# Patient Record
Sex: MALE | Race: WHITE | NOT HISPANIC OR LATINO | Employment: OTHER | URBAN - METROPOLITAN AREA
[De-identification: names, ages, dates, MRNs, and addresses within clinical notes are randomized per-mention and may not be internally consistent; named-entity substitution may affect disease eponyms.]

---

## 2023-07-12 LAB
ANION GAP IN SER/PLAS: 14 MMOL/L (ref 10–20)
CALCIUM (MG/DL) IN SER/PLAS: 9.1 MG/DL (ref 8.6–10.3)
CARBON DIOXIDE, TOTAL (MMOL/L) IN SER/PLAS: 28 MMOL/L (ref 21–32)
CHLORIDE (MMOL/L) IN SER/PLAS: 100 MMOL/L (ref 98–107)
CREATININE (MG/DL) IN SER/PLAS: 1.11 MG/DL (ref 0.5–1.3)
ERYTHROCYTE DISTRIBUTION WIDTH (RATIO) BY AUTOMATED COUNT: 14.6 % (ref 11.5–14.5)
ERYTHROCYTE MEAN CORPUSCULAR HEMOGLOBIN CONCENTRATION (G/DL) BY AUTOMATED: 34.1 G/DL (ref 32–36)
ERYTHROCYTE MEAN CORPUSCULAR VOLUME (FL) BY AUTOMATED COUNT: 85 FL (ref 80–100)
ERYTHROCYTES (10*6/UL) IN BLOOD BY AUTOMATED COUNT: 4.53 X10E12/L (ref 4.5–5.9)
GFR MALE: 73 ML/MIN/1.73M2
GLUCOSE (MG/DL) IN SER/PLAS: 116 MG/DL (ref 74–99)
HEMATOCRIT (%) IN BLOOD BY AUTOMATED COUNT: 38.4 % (ref 41–52)
HEMOGLOBIN (G/DL) IN BLOOD: 13.1 G/DL (ref 13.5–17.5)
INR IN PPP BY COAGULATION ASSAY: 1.1 (ref 0.9–1.1)
LEUKOCYTES (10*3/UL) IN BLOOD BY AUTOMATED COUNT: 8 X10E9/L (ref 4.4–11.3)
PLATELETS (10*3/UL) IN BLOOD AUTOMATED COUNT: 142 X10E9/L (ref 150–450)
POTASSIUM (MMOL/L) IN SER/PLAS: 3.2 MMOL/L (ref 3.5–5.3)
PROTHROMBIN TIME (PT) IN PPP BY COAGULATION ASSAY: 12.7 SEC (ref 9.8–12.8)
SODIUM (MMOL/L) IN SER/PLAS: 139 MMOL/L (ref 136–145)
UREA NITROGEN (MG/DL) IN SER/PLAS: 28 MG/DL (ref 6–23)

## 2023-08-24 LAB
ANION GAP IN SER/PLAS: 15 MMOL/L (ref 10–20)
CALCIUM (MG/DL) IN SER/PLAS: 9.4 MG/DL (ref 8.6–10.3)
CARBON DIOXIDE, TOTAL (MMOL/L) IN SER/PLAS: 30 MMOL/L (ref 21–32)
CHLORIDE (MMOL/L) IN SER/PLAS: 101 MMOL/L (ref 98–107)
CREATININE (MG/DL) IN SER/PLAS: 1.25 MG/DL (ref 0.5–1.3)
ERYTHROCYTE DISTRIBUTION WIDTH (RATIO) BY AUTOMATED COUNT: 14.8 % (ref 11.5–14.5)
ERYTHROCYTE MEAN CORPUSCULAR HEMOGLOBIN CONCENTRATION (G/DL) BY AUTOMATED: 33 G/DL (ref 32–36)
ERYTHROCYTE MEAN CORPUSCULAR VOLUME (FL) BY AUTOMATED COUNT: 89 FL (ref 80–100)
ERYTHROCYTES (10*6/UL) IN BLOOD BY AUTOMATED COUNT: 4.67 X10E12/L (ref 4.5–5.9)
GFR MALE: 63 ML/MIN/1.73M2
GLUCOSE (MG/DL) IN SER/PLAS: 118 MG/DL (ref 74–99)
HEMATOCRIT (%) IN BLOOD BY AUTOMATED COUNT: 41.5 % (ref 41–52)
HEMOGLOBIN (G/DL) IN BLOOD: 13.7 G/DL (ref 13.5–17.5)
LEUKOCYTES (10*3/UL) IN BLOOD BY AUTOMATED COUNT: 8.1 X10E9/L (ref 4.4–11.3)
PLATELETS (10*3/UL) IN BLOOD AUTOMATED COUNT: 146 X10E9/L (ref 150–450)
POTASSIUM (MMOL/L) IN SER/PLAS: 3.5 MMOL/L (ref 3.5–5.3)
SODIUM (MMOL/L) IN SER/PLAS: 142 MMOL/L (ref 136–145)
UREA NITROGEN (MG/DL) IN SER/PLAS: 25 MG/DL (ref 6–23)

## 2023-09-01 ENCOUNTER — HOSPITAL ENCOUNTER (OUTPATIENT)
Dept: DATA CONVERSION | Facility: HOSPITAL | Age: 68
End: 2023-09-01
Attending: INTERNAL MEDICINE | Admitting: INTERNAL MEDICINE
Payer: MEDICARE

## 2023-09-01 DIAGNOSIS — I10 ESSENTIAL (PRIMARY) HYPERTENSION: ICD-10-CM

## 2023-09-01 DIAGNOSIS — I25.810 ATHEROSCLEROSIS OF CORONARY ARTERY BYPASS GRAFT(S) WITHOUT ANGINA PECTORIS: ICD-10-CM

## 2023-09-01 DIAGNOSIS — R07.89 OTHER CHEST PAIN: ICD-10-CM

## 2023-09-01 DIAGNOSIS — I25.10 ATHEROSCLEROTIC HEART DISEASE OF NATIVE CORONARY ARTERY WITHOUT ANGINA PECTORIS: ICD-10-CM

## 2023-09-01 DIAGNOSIS — I25.82 CHRONIC TOTAL OCCLUSION OF CORONARY ARTERY: ICD-10-CM

## 2023-09-01 DIAGNOSIS — E78.5 HYPERLIPIDEMIA, UNSPECIFIED: ICD-10-CM

## 2023-09-01 DIAGNOSIS — Z95.5 PRESENCE OF CORONARY ANGIOPLASTY IMPLANT AND GRAFT: ICD-10-CM

## 2023-09-01 DIAGNOSIS — Z95.1 PRESENCE OF AORTOCORONARY BYPASS GRAFT: ICD-10-CM

## 2023-09-01 DIAGNOSIS — I25.2 OLD MYOCARDIAL INFARCTION: ICD-10-CM

## 2023-09-11 ENCOUNTER — HOSPITAL ENCOUNTER (OUTPATIENT)
Dept: DATA CONVERSION | Facility: HOSPITAL | Age: 68
Discharge: HOME | End: 2023-09-11
Payer: MEDICARE

## 2023-09-11 DIAGNOSIS — Z01.89 ENCOUNTER FOR OTHER SPECIFIED SPECIAL EXAMINATIONS: ICD-10-CM

## 2023-09-11 DIAGNOSIS — Z12.5 ENCOUNTER FOR SCREENING FOR MALIGNANT NEOPLASM OF PROSTATE: ICD-10-CM

## 2023-09-11 DIAGNOSIS — E55.9 VITAMIN D DEFICIENCY, UNSPECIFIED: ICD-10-CM

## 2023-09-11 DIAGNOSIS — E11.65 TYPE 2 DIABETES MELLITUS WITH HYPERGLYCEMIA (MULTI): ICD-10-CM

## 2023-09-11 DIAGNOSIS — E78.2 MIXED HYPERLIPIDEMIA: ICD-10-CM

## 2023-09-11 LAB
25(OH)D3 SERPL-MCNC: 33 NG/ML (ref 31–100)
ALBUMIN SERPL-MCNC: 3.9 GM/DL (ref 3.5–5)
ALBUMIN/GLOB SERPL: 1.3 RATIO (ref 1.5–3)
ALP BLD-CCNC: 102 U/L (ref 35–125)
ALT SERPL-CCNC: 26 U/L (ref 5–40)
ANION GAP SERPL CALCULATED.3IONS-SCNC: 12 MMOL/L (ref 0–19)
APPEARANCE PLAS: ABNORMAL
AST SERPL-CCNC: 23 U/L (ref 5–40)
BASOPHILS # BLD AUTO: 0.05 K/UL (ref 0–0.22)
BASOPHILS NFR BLD AUTO: 0.8 % (ref 0–1)
BILIRUB DIRECT SERPL-MCNC: 0.2 MG/DL (ref 0–0.2)
BILIRUB INDIRECT SERPL-MCNC: 0.6 MG/DL (ref 0–0.8)
BILIRUB SERPL-MCNC: 0.8 MG/DL (ref 0.1–1.2)
BUN SERPL-MCNC: 20 MG/DL (ref 8–25)
BUN/CREAT SERPL: 20 RATIO (ref 8–21)
CALCIUM SERPL-MCNC: 8.9 MG/DL (ref 8.5–10.4)
CHLORIDE SERPL-SCNC: 107 MMOL/L (ref 97–107)
CHOLEST SERPL-MCNC: 93 MG/DL (ref 133–200)
CHOLEST/HDLC SERPL: 2.9 RATIO
CO2 SERPL-SCNC: 26 MMOL/L (ref 24–31)
COLOR SPUN FLD: ABNORMAL
CREAT SERPL-MCNC: 1 MG/DL (ref 0.4–1.6)
CREAT UR-MCNC: 113.4 MG/DL
DEPRECATED RDW RBC AUTO: 46.4 FL (ref 37–54)
DIFFERENTIAL METHOD BLD: ABNORMAL
EOSINOPHIL # BLD AUTO: 0.25 K/UL (ref 0–0.45)
EOSINOPHIL NFR BLD: 3.8 % (ref 0–3)
ERYTHROCYTE [DISTWIDTH] IN BLOOD BY AUTOMATED COUNT: 14.4 % (ref 11.7–15)
FASTING STATUS PATIENT QL REPORTED: ABNORMAL
GFR SERPL CREATININE-BSD FRML MDRD: 82 ML/MIN/1.73 M2
GLOBULIN SER-MCNC: 3 G/DL (ref 1.9–3.7)
GLUCOSE SERPL-MCNC: 112 MG/DL (ref 65–99)
HBA1C MFR BLD: 6.3 % (ref 4–6)
HCT VFR BLD AUTO: 38.3 % (ref 41–50)
HDLC SERPL-MCNC: 32 MG/DL
HGB BLD-MCNC: 12.6 GM/DL (ref 13.5–16.5)
IMM GRANULOCYTES # BLD AUTO: 0.01 K/UL (ref 0–0.1)
LDLC SERPL CALC-MCNC: 42 MG/DL (ref 65–130)
LYMPHOCYTES # BLD AUTO: 2 K/UL (ref 1.2–3.2)
LYMPHOCYTES NFR BLD MANUAL: 30.7 % (ref 20–40)
MCH RBC QN AUTO: 29.2 PG (ref 26–34)
MCHC RBC AUTO-ENTMCNC: 32.9 % (ref 31–37)
MCV RBC AUTO: 88.7 FL (ref 80–100)
MICROALBUMIN UR-MCNC: 19 MG/L (ref 0–23)
MICROALBUMIN/CREAT UR: 16.8 MG/G (ref 0–30)
MONOCYTES # BLD AUTO: 0.51 K/UL (ref 0–0.8)
MONOCYTES NFR BLD MANUAL: 7.8 % (ref 0–8)
NEUTROPHILS # BLD AUTO: 3.7 K/UL
NEUTROPHILS # BLD AUTO: 3.7 K/UL (ref 1.8–7.7)
NEUTROPHILS.IMMATURE NFR BLD: 0.2 % (ref 0–1)
NEUTS SEG NFR BLD: 56.7 % (ref 50–70)
NRBC BLD-RTO: 0 /100 WBC
PLATELET # BLD AUTO: 133 K/UL (ref 150–450)
PMV BLD AUTO: 12.6 CU (ref 7–12.6)
POTASSIUM SERPL-SCNC: 3.3 MMOL/L (ref 3.4–5.1)
PROT SERPL-MCNC: 6.9 G/DL (ref 5.9–7.9)
PSA SERPL-MCNC: 0.6 NG/ML (ref 0–4.1)
RBC # BLD AUTO: 4.32 M/UL (ref 4.5–5.5)
SODIUM SERPL-SCNC: 144 MMOL/L (ref 133–145)
T4 FREE SERPL-MCNC: 1.4 NG/DL (ref 0.9–1.7)
TRIGL SERPL-MCNC: 94 MG/DL (ref 40–150)
TSH SERPL DL<=0.05 MIU/L-ACNC: 4.55 MIU/L (ref 0.27–4.2)
WBC # BLD AUTO: 6.5 K/UL (ref 4.5–11)

## 2023-09-17 PROBLEM — I46.9 CARDIAC ARREST (MULTI): Status: ACTIVE | Noted: 2023-09-17

## 2023-09-17 PROBLEM — K85.90 PANCREATITIS (HHS-HCC): Status: ACTIVE | Noted: 2023-09-17

## 2023-09-17 PROBLEM — R14.0 ABDOMINAL DISTENSION: Status: ACTIVE | Noted: 2023-09-17

## 2023-09-17 PROBLEM — E11.9 DIABETES MELLITUS WITHOUT COMPLICATION (MULTI): Status: ACTIVE | Noted: 2023-09-17

## 2023-09-17 PROBLEM — K86.3 PSEUDOCYST OF PANCREAS (HHS-HCC): Status: ACTIVE | Noted: 2023-09-17

## 2023-09-17 PROBLEM — E78.5 HYPERLIPIDEMIA: Status: ACTIVE | Noted: 2023-09-17

## 2023-09-17 PROBLEM — Z95.810 CARDIAC DEFIBRILLATOR IN PLACE: Status: ACTIVE | Noted: 2023-09-17

## 2023-09-17 PROBLEM — F32.A DEPRESSION: Status: ACTIVE | Noted: 2023-09-17

## 2023-09-17 PROBLEM — K21.9 GASTROESOPHAGEAL REFLUX DISEASE: Status: ACTIVE | Noted: 2023-09-17

## 2023-09-17 PROBLEM — N40.0 BENIGN PROSTATIC HYPERPLASIA: Status: ACTIVE | Noted: 2023-09-17

## 2023-09-17 PROBLEM — L30.9 ECZEMA: Status: ACTIVE | Noted: 2023-09-17

## 2023-09-17 PROBLEM — I42.9 CARDIOMYOPATHY (MULTI): Status: ACTIVE | Noted: 2023-09-17

## 2023-09-17 PROBLEM — G47.30 SLEEP APNEA: Status: ACTIVE | Noted: 2023-09-17

## 2023-09-17 PROBLEM — I25.10 ARTERIOSCLEROSIS OF CORONARY ARTERY: Status: ACTIVE | Noted: 2023-09-17

## 2023-09-17 PROBLEM — D69.6 THROMBOCYTOPENIA (CMS-HCC): Status: ACTIVE | Noted: 2023-09-17

## 2023-09-17 PROBLEM — I50.9 CONGESTIVE HEART FAILURE (MULTI): Status: ACTIVE | Noted: 2023-09-17

## 2023-09-17 PROBLEM — I10 HYPERTENSION: Status: ACTIVE | Noted: 2023-09-17

## 2023-09-17 PROBLEM — R60.9 EDEMA: Status: ACTIVE | Noted: 2023-09-17

## 2023-09-17 RX ORDER — DOXAZOSIN 8 MG/1
8 TABLET ORAL DAILY
COMMUNITY
End: 2023-12-04 | Stop reason: SDUPTHER

## 2023-09-17 RX ORDER — FAMOTIDINE 20 MG/1
20 TABLET, FILM COATED ORAL DAILY
COMMUNITY
End: 2024-03-20 | Stop reason: SDUPTHER

## 2023-09-17 RX ORDER — ATORVASTATIN CALCIUM 80 MG/1
80 TABLET, FILM COATED ORAL DAILY
COMMUNITY
End: 2023-12-06 | Stop reason: SDUPTHER

## 2023-09-17 RX ORDER — NITROGLYCERIN 0.4 MG/1
TABLET SUBLINGUAL
COMMUNITY
Start: 2011-05-14 | End: 2024-03-20 | Stop reason: SDUPTHER

## 2023-09-17 RX ORDER — ASPIRIN 81 MG/1
TABLET ORAL
COMMUNITY
End: 2024-03-20 | Stop reason: SDUPTHER

## 2023-09-17 RX ORDER — MAGNESIUM HYDROXIDE 400 MG/5ML
SUSPENSION, ORAL (FINAL DOSE FORM) ORAL
COMMUNITY
Start: 2014-11-10 | End: 2024-03-20 | Stop reason: SDUPTHER

## 2023-09-17 RX ORDER — FUROSEMIDE 20 MG/1
20 TABLET ORAL EVERY 12 HOURS
COMMUNITY
End: 2023-10-26 | Stop reason: SDUPTHER

## 2023-09-17 RX ORDER — AMLODIPINE BESYLATE 5 MG/1
5 TABLET ORAL DAILY
COMMUNITY
End: 2024-03-20 | Stop reason: SDUPTHER

## 2023-09-17 RX ORDER — METFORMIN HYDROCHLORIDE 500 MG/1
TABLET, EXTENDED RELEASE ORAL
COMMUNITY
Start: 2021-02-17 | End: 2024-01-08 | Stop reason: SDUPTHER

## 2023-09-17 RX ORDER — LOSARTAN POTASSIUM AND HYDROCHLOROTHIAZIDE 25; 100 MG/1; MG/1
1 TABLET ORAL DAILY
COMMUNITY
End: 2024-03-20 | Stop reason: SDUPTHER

## 2023-09-17 RX ORDER — FLAXSEED OIL 1000 MG
CAPSULE ORAL
COMMUNITY
Start: 2014-10-20

## 2023-09-17 RX ORDER — CARVEDILOL 25 MG/1
TABLET ORAL
COMMUNITY
Start: 2014-10-20 | End: 2023-11-28 | Stop reason: SDUPTHER

## 2023-09-18 ENCOUNTER — HOSPITAL ENCOUNTER (OUTPATIENT)
Dept: DATA CONVERSION | Facility: HOSPITAL | Age: 68
End: 2023-09-18
Attending: INTERNAL MEDICINE | Admitting: INTERNAL MEDICINE
Payer: MEDICARE

## 2023-09-18 DIAGNOSIS — I47.20 VENTRICULAR TACHYCARDIA, UNSPECIFIED (MULTI): ICD-10-CM

## 2023-09-23 LAB
ATRIAL RATE: 44 BPM
P AXIS: 93 DEGREES
P OFFSET: 180 MS
P ONSET: 121 MS
Q ONSET: 189 MS
QRS COUNT: 14 BEATS
QRS DURATION: 184 MS
QT INTERVAL: 506 MS
QTC CALCULATION(BAZETT): 591 MS
QTC FREDERICIA: 561 MS
R AXIS: 211 DEGREES
T AXIS: 59 DEGREES
T OFFSET: 442 MS
VENTRICULAR RATE: 82 BPM

## 2023-09-29 VITALS — HEIGHT: 68 IN | WEIGHT: 251.32 LBS | BODY MASS INDEX: 38.09 KG/M2

## 2023-09-29 LAB
POC ACTIVATED CLOTTING TIME HIGH RANGE: 146 SECONDS (ref 96–152)
POC ACTIVATED CLOTTING TIME HIGH RANGE: 178 SECONDS (ref 96–152)
POC ACTIVATED CLOTTING TIME HIGH RANGE: 251 SECONDS (ref 96–152)

## 2023-09-30 NOTE — H&P
History & Physical Reviewed:   I have reviewed the History and Physical dated:  15-Aug-2023   History and Physical reviewed and relevant findings noted. Patient examined to review pertinent physical  findings.: No significant changes   Home Medications Reviewed: no changes noted   Allergies Reviewed: no changes noted       Airway/Sedation Assessment:  ·  Emotional Status calm   ·  Neurologic alert & oriented x 3   ·  Respiratory clear to auscultation   ·  Cardiovascular rhythm & rate regular   · Pulses present: Pedal Right     ·  Mouth Opening OK yes   ·  Neck Flexibility OK yes   ·  Loose Teeth no   ·  Oropharyngeal Classification Class II   ·  ASA PS Classification ASA II   ·  Sedation Plan moderate sedation       ERAS (Enhanced Recovery After Surgery):  ·  ERAS Patient: no     Consent:   COVID-19 Consent:  ·  COVID-19 Risk Consent Surgeon has reviewed key risks related to the risk of tom COVID-19 and if they contract COVID-19 what the risks are.       Electronic Signatures:  Camron Masters)  (Signed 01-Sep-2023 08:38)   Authored: History & Physical Reviewed, Airway/Sedation,  ERAS, Consent, Note Completion      Last Updated: 01-Sep-2023 08:38 by Camron Masters)

## 2023-09-30 NOTE — H&P
History of Present Illness:   History Present Illness:  Reason for surgery: Ventricular Tachycardia device  therapy   HPI:    Homero Brasher is a 67 year old with HTN, CAD s/p CABG in 2003, Hx of Cardiac Arrest - on 09/25/2014 s/p CRT- D presenting with recurrent ICD therapy. He presents  for a VT RFA.     Results of cMRI and LHC noted in the EMR.       Allergies:        Allergies:  ·  No Known Allergies :     Home Medication Review:   Home Medications Reviewed: yes     Impression/Procedure:   ·  Impression and Planned Procedure: Recurrent VT presenting for RFA.       ERAS (Enhanced Recovery After Surgery):  ·  ERAS Patient: no       Physical Exam by System:    Constitutional: Well developed, awake/alert/oriented  x3, no distress, alert and cooperative   Respiratory/Thorax: Patent airways, normal breath  sounds with good chest expansion, thorax symmetric   Cardiovascular: Regular, 2+ equal pulses of the extremities,   Gastrointestinal: Nondistended, soft, non-tender   Neurological: alert and oriented x3   Skin: Warm and dry, no lesions, no rashes     Consent:   COVID-19 Consent:  ·  COVID-19 Risk Consent Surgeon has reviewed key risks related to the risk of tom COVID-19 and if they contract COVID-19 what the risks are.     Attestation:   Note Completion:  I am a:  Resident/Fellow   Attending Attestation I saw and evaluated the patient.  I personally obtained the key and critical portions of the history and physical exam or was physically present for key and  critical portions performed by the resident/fellow. I reviewed the resident/fellow?s documentation and discussed the patient with the resident/fellow.  I agree with the resident/fellow?s medical decision making as documented in the note.     I personally evaluated the patient on 18-Sep-2023         Electronic Signatures:  Ramiro Posadas (Fellow))  (Signed 18-Sep-2023 16:21)   Authored: History of Present Illness, Allergies, Home  Medication Review,  Impression/Procedure, ERAS, Physical Exam, Consent, Note Completion  David Hastings)  (Signed 19-Sep-2023 09:54)   Authored: Note Completion   Co-Signer: History of Present Illness, Allergies, Home Medication Review, Impression/Procedure, ERAS, Physical Exam, Consent, Note Completion      Last Updated: 19-Sep-2023 09:54 by David Hastings)

## 2023-10-11 ENCOUNTER — HOSPITAL ENCOUNTER (OUTPATIENT)
Dept: CARDIOLOGY | Facility: CLINIC | Age: 68
Discharge: HOME | End: 2023-10-11
Payer: MEDICARE

## 2023-10-11 DIAGNOSIS — Z95.810 PRESENCE OF AUTOMATIC CARDIOVERTER/DEFIBRILLATOR (AICD): ICD-10-CM

## 2023-10-11 DIAGNOSIS — I49.01 VF (VENTRICULAR FIBRILLATION) (MULTI): ICD-10-CM

## 2023-10-11 DIAGNOSIS — I49.01 VF (VENTRICULAR FIBRILLATION) (MULTI): Primary | ICD-10-CM

## 2023-10-26 ENCOUNTER — TELEPHONE (OUTPATIENT)
Dept: PRIMARY CARE | Facility: CLINIC | Age: 68
End: 2023-10-26
Payer: MEDICARE

## 2023-10-26 DIAGNOSIS — I10 ESSENTIAL HYPERTENSION: Primary | ICD-10-CM

## 2023-10-26 RX ORDER — FUROSEMIDE 20 MG/1
20 TABLET ORAL EVERY 12 HOURS
Qty: 180 TABLET | Refills: 3 | Status: SHIPPED | OUTPATIENT
Start: 2023-10-26 | End: 2024-03-20 | Stop reason: SDUPTHER

## 2023-10-26 NOTE — TELEPHONE ENCOUNTER
Patient requesting refill to [facility]:      Furosemide 20 MG Tablet    Disp: 180  Tablet, Duration:  90 day(s)    Si tablet Orally every 12 hours 90 days  Refills: 1

## 2023-10-30 PROBLEM — Z23 ENCOUNTER FOR IMMUNIZATION: Status: ACTIVE | Noted: 2023-10-30

## 2023-11-28 DIAGNOSIS — I10 ESSENTIAL HYPERTENSION: Primary | ICD-10-CM

## 2023-11-28 NOTE — TELEPHONE ENCOUNTER
Left message on machine for patient to call office back. In chart patient is taking 25mg two times a day. Waiting on return call to confirm dose aptient is taking

## 2023-11-29 NOTE — TELEPHONE ENCOUNTER
Spoke with patient to confirm that he is taking 25 mg bid. States that he is out of Carvedilol. Now also requesting:  Doxazosin Mesylate (8 MG Tablet)1 tablet Once a day , Orally and Atorvastatin Calcium (80 MG Tablet)1 tablet Once a day , Orally.

## 2023-12-04 RX ORDER — CARVEDILOL 25 MG/1
TABLET ORAL
Qty: 90 TABLET | Refills: 3 | Status: SHIPPED | OUTPATIENT
Start: 2023-12-04 | End: 2024-03-20 | Stop reason: SDUPTHER

## 2023-12-04 RX ORDER — DOXAZOSIN 8 MG/1
8 TABLET ORAL DAILY
Qty: 90 TABLET | Refills: 3 | Status: SHIPPED | OUTPATIENT
Start: 2023-12-04 | End: 2024-03-20 | Stop reason: SDUPTHER

## 2023-12-06 ENCOUNTER — TELEPHONE (OUTPATIENT)
Dept: PRIMARY CARE | Facility: CLINIC | Age: 68
End: 2023-12-06
Payer: MEDICARE

## 2023-12-06 DIAGNOSIS — E78.2 MIXED HYPERLIPIDEMIA: Primary | ICD-10-CM

## 2023-12-06 RX ORDER — ATORVASTATIN CALCIUM 80 MG/1
80 TABLET, FILM COATED ORAL DAILY
Qty: 30 TABLET | Refills: 3 | Status: SHIPPED | OUTPATIENT
Start: 2023-12-06 | End: 2024-03-20 | Stop reason: SDUPTHER

## 2024-01-08 ENCOUNTER — TELEPHONE (OUTPATIENT)
Dept: PRIMARY CARE | Facility: CLINIC | Age: 69
End: 2024-01-08
Payer: MEDICARE

## 2024-01-08 DIAGNOSIS — E11.9 DIABETES MELLITUS WITHOUT COMPLICATION (MULTI): Primary | ICD-10-CM

## 2024-01-09 RX ORDER — METFORMIN HYDROCHLORIDE 500 MG/1
TABLET, EXTENDED RELEASE ORAL
Qty: 90 TABLET | Refills: 3 | Status: SHIPPED | OUTPATIENT
Start: 2024-01-09 | End: 2024-03-20 | Stop reason: SDUPTHER

## 2024-01-24 ENCOUNTER — HOSPITAL ENCOUNTER (OUTPATIENT)
Dept: CARDIOLOGY | Facility: CLINIC | Age: 69
Discharge: HOME | End: 2024-01-24
Payer: MEDICARE

## 2024-01-24 DIAGNOSIS — Z95.810 PRESENCE OF AUTOMATIC CARDIOVERTER/DEFIBRILLATOR (AICD): ICD-10-CM

## 2024-01-24 DIAGNOSIS — I49.01 VF (VENTRICULAR FIBRILLATION) (MULTI): ICD-10-CM

## 2024-01-24 PROCEDURE — 93284 PRGRMG EVAL IMPLANTABLE DFB: CPT | Performed by: INTERNAL MEDICINE

## 2024-01-24 PROCEDURE — 93284 PRGRMG EVAL IMPLANTABLE DFB: CPT

## 2024-01-24 PROCEDURE — 93290 INTERROG DEV EVAL ICPMS IP: CPT | Performed by: INTERNAL MEDICINE

## 2024-02-20 ENCOUNTER — OFFICE VISIT (OUTPATIENT)
Dept: CARDIOLOGY | Facility: CLINIC | Age: 69
End: 2024-02-20
Payer: MEDICARE

## 2024-02-20 VITALS
BODY MASS INDEX: 38.49 KG/M2 | RESPIRATION RATE: 16 BRPM | OXYGEN SATURATION: 96 % | HEIGHT: 68 IN | HEART RATE: 60 BPM | DIASTOLIC BLOOD PRESSURE: 74 MMHG | WEIGHT: 254 LBS | SYSTOLIC BLOOD PRESSURE: 113 MMHG

## 2024-02-20 DIAGNOSIS — I25.10 CORONARY ARTERY DISEASE INVOLVING NATIVE CORONARY ARTERY OF NATIVE HEART, UNSPECIFIED WHETHER ANGINA PRESENT: ICD-10-CM

## 2024-02-20 DIAGNOSIS — Z95.810 ICD (IMPLANTABLE CARDIOVERTER-DEFIBRILLATOR) IN PLACE: ICD-10-CM

## 2024-02-20 DIAGNOSIS — Z95.1 S/P CABG X 3: Primary | ICD-10-CM

## 2024-02-20 DIAGNOSIS — I10 HYPERTENSION, UNSPECIFIED TYPE: ICD-10-CM

## 2024-02-20 PROCEDURE — 3074F SYST BP LT 130 MM HG: CPT | Performed by: INTERNAL MEDICINE

## 2024-02-20 PROCEDURE — 1126F AMNT PAIN NOTED NONE PRSNT: CPT | Performed by: INTERNAL MEDICINE

## 2024-02-20 PROCEDURE — 1159F MED LIST DOCD IN RCRD: CPT | Performed by: INTERNAL MEDICINE

## 2024-02-20 PROCEDURE — 93010 ELECTROCARDIOGRAM REPORT: CPT | Performed by: INTERNAL MEDICINE

## 2024-02-20 PROCEDURE — 93005 ELECTROCARDIOGRAM TRACING: CPT | Performed by: INTERNAL MEDICINE

## 2024-02-20 PROCEDURE — 99214 OFFICE O/P EST MOD 30 MIN: CPT | Performed by: INTERNAL MEDICINE

## 2024-02-20 PROCEDURE — 1036F TOBACCO NON-USER: CPT | Performed by: INTERNAL MEDICINE

## 2024-02-20 PROCEDURE — 3078F DIAST BP <80 MM HG: CPT | Performed by: INTERNAL MEDICINE

## 2024-02-20 ASSESSMENT — ENCOUNTER SYMPTOMS
ENDOCRINE NEGATIVE: 1
HEMATOLOGIC/LYMPHATIC NEGATIVE: 1
MUSCULOSKELETAL NEGATIVE: 1
GASTROINTESTINAL NEGATIVE: 1
ALLERGIC/IMMUNOLOGIC NEGATIVE: 1
CONSTITUTIONAL NEGATIVE: 1
PSYCHIATRIC NEGATIVE: 1
CARDIOVASCULAR NEGATIVE: 1
RESPIRATORY NEGATIVE: 1
EYES NEGATIVE: 1
NEUROLOGICAL NEGATIVE: 1
DEPRESSION: 0

## 2024-02-20 ASSESSMENT — PATIENT HEALTH QUESTIONNAIRE - PHQ9
2. FEELING DOWN, DEPRESSED OR HOPELESS: NOT AT ALL
1. LITTLE INTEREST OR PLEASURE IN DOING THINGS: NOT AT ALL
SUM OF ALL RESPONSES TO PHQ9 QUESTIONS 1 AND 2: 0

## 2024-02-20 NOTE — PROGRESS NOTES
"Chief Complaint:   Follow-up (6 Month FUV)     History Of Present Illness:    Homero Brasher is a 68 y.o. male presenting for follow-up.  Underwent VT ablation in September, states he actually is feeling better since then.  Denies any angina, palpitations, dizziness, lower extremity edema.  Compliant medications.     Last Recorded Vitals:  Vitals:    02/20/24 0818   BP: 113/74   BP Location: Left arm   Patient Position: Sitting   BP Cuff Size: Large adult   Pulse: 60   Resp: 16   SpO2: 96%   Weight: 115 kg (254 lb)   Height: 1.727 m (5' 8\")       Past Medical History:  He has a past medical history of Presence of cardiac pacemaker (03/01/2016).    Past Surgical History:  He has a past surgical history that includes Cholecystectomy (10/20/2014); Coronary artery bypass graft (10/20/2014); and Other surgical history (01/05/2022).      Social History:  He reports that he has never smoked. He has never used smokeless tobacco. He reports that he does not currently use alcohol. He reports that he does not use drugs.    Family History:  Family History   Problem Relation Name Age of Onset    Other (CVA) Father      Stroke Father      Esophageal cancer Brother          Allergies:  Aspirin    Outpatient Medications:  Current Outpatient Medications   Medication Instructions    amLODIPine (NORVASC) 5 mg, oral, Daily    aspirin 81 mg EC tablet 1 tablet Orally Once a day    atorvastatin (LIPITOR) 80 mg, oral, Daily    carvedilol (Coreg) 25 mg tablet TAKE 1 TABLET BY MOUTH 2 TIMES A DAY for 90    doxazosin (CARDURA) 8 mg, oral, Daily    famotidine (PEPCID) 20 mg, oral, Daily    flaxseed oiL 1,000 mg capsule TAKE AS DIRECTED.    furosemide (LASIX) 20 mg, oral, Every 12 hours    losartan-hydrochlorothiazide (Hyzaar) 100-25 mg tablet 1 tablet, oral, Daily    metFORMIN  mg 24 hr tablet 1 tablet with evening meal Orally Once a day for 90 days    multivit-min/ferrous fumarate (MULTI VITAMIN ORAL) as directed Orally    " nitroglycerin (Nitrostat) 0.4 mg SL tablet 1 tablet under the tongue Sublingual prn    potassium gluconate 595 mg (99 mg) tablet 2 tablets Orally Once a day     Review of Systems   Constitutional: Negative.    HENT: Negative.     Eyes: Negative.    Respiratory: Negative.     Cardiovascular: Negative.    Gastrointestinal: Negative.    Endocrine: Negative.    Genitourinary: Negative.    Musculoskeletal: Negative.    Skin: Negative.    Allergic/Immunologic: Negative.    Neurological: Negative.    Hematological: Negative.    Psychiatric/Behavioral: Negative.          Physical Exam:  Constitutional:       Appearance: Healthy appearance. Not in distress.   Neck:      Vascular: No JVR. JVD normal.   Pulmonary:      Effort: Pulmonary effort is normal.      Breath sounds: Normal breath sounds. No wheezing. No rhonchi. No rales.   Chest:      Chest wall: Not tender to palpatation.   Cardiovascular:      Normal rate. Regular rhythm.      Murmurs: There is no murmur.   Edema:     Peripheral edema absent.   Abdominal:      General: Bowel sounds are normal.      Palpations: Abdomen is soft.      Tenderness: There is no abdominal tenderness.   Musculoskeletal: Normal range of motion. Skin:     General: Skin is warm and dry.   Neurological:      General: No focal deficit present.      Mental Status: Alert and oriented to person, place and time.           Last Labs:  CBC -  Lab Results   Component Value Date    WBC 6.5 09/11/2023    HGB 12.6 (L) 09/11/2023    HCT 38.3 (L) 09/11/2023    MCV 88.7 09/11/2023     (L) 09/11/2023       CMP -  Lab Results   Component Value Date    CALCIUM 8.9 09/11/2023    PROT 6.9 09/11/2023    ALBUMIN 3.9 09/11/2023    AST 23 09/11/2023    ALT 26 09/11/2023    ALKPHOS 102 09/11/2023    BILITOT 0.8 09/11/2023       LIPID PANEL -   Lab Results   Component Value Date    CHOL 93 (L) 09/11/2023    TRIG 94 09/11/2023    HDL 32 (L) 09/11/2023    CHHDL 2.9 09/11/2023       RENAL FUNCTION PANEL -   Lab  Results   Component Value Date    GLUCOSE 112 (H) 09/11/2023     09/11/2023    K 3.3 (L) 09/11/2023     09/11/2023    CO2 26 09/11/2023    ANIONGAP 12 09/11/2023    BUN 20 09/11/2023    CREATININE 1.0 09/11/2023    GFRMALE 63 08/24/2023    CALCIUM 8.9 09/11/2023    ALBUMIN 3.9 09/11/2023        Lab Results   Component Value Date    HGBA1C 6.3 (H) 09/11/2023       Last Cardiology Tests:   CMR 9/2023:  1. Severely limited exam secondary to susceptibility artifact caused   by pacer. Obtained measurements  demonstrate normal LV size with moderate to severely reduced systolic   function (LVEF =25%). The accuracy of  these numbers may be limited from the artifact and the patient might   benefit from updated echocardiography  as clinically warranted.     2. Subendocardial delayed enhancement involving the basal   anterolateral and inferolateral walls as  described, consistent with prior infarction. The infarct is   essentially transmural in the basal inferior  and inferolateral walls.      3. Dilated main pulmonary artery which can be seen with pulmonary   hypertension.     Cath 9/1/23:  1. Left main: 20-30% distal stenosis.  2. LAD: smaller caliber, 70% proximal disease, 100% mid-vessel stenosis.  3. Ramus: mild irregularities.  4. LCx: very small caliber, 70% ostial stenosis.  5. RCA: no signfiicant disease with patent distal stent, 30% mid-rPLV stenosis.  6. Grafts: (1) LIMA - LAD patent (2) SVG to rPDA occluded (3) SVG to ramus occluded.  7. LVEDP 16mmHg, no aortic stenosis on LV-Ao gradient.    Echo 3/16/23:  1. Left ventricular systolic function is normal with a 55-60% estimated ejection fraction.  2. Abnormal septal motion consistent with RV pacemaker.  3. Mild to moderate tricuspid regurgitation visualized.  4. There is mild mitral regurgitation.  5. Moderately elevated pulmonary artery pressure, estimated RVSP 52mmHg.     Echo 3/20/2019   1. The left ventricular systolic function is normal with a  55-60% estimated  ejection fraction.   2. There is mild mitral and tricuspid regurgitation.     Echo 2/29/16:   1. The left ventricular systolic function is low normal with a 50-55% ejection fraction.   2. The left atrium is normal in size.   3. There is mild mitral and tricuspid regurgitation.   4. The estimated pulmonary artery pressure is mildly elevated with the RVSP at 39mmHg.        Cath September 2014:  * Left main: 40-50% distal tubular lesion.  * Left Anterior Descending Coronary Artery : Twin LAD system: LAD1:  50-60% proximal stenosis (2mm vessel). LAD2: mild diffuse disease  proximally with 100% mid-vessel occlusion  * Left Circumflex : 95 ostial stenosis.  * Right coronary artery: 90% distal, focal stenosis. 50% PLV stenosis at  angulation.  * Grafts: (1) LIMA to LAD2 patent (2) SVG to OM1 patent, native AV  groove circumflex with moderate diffuse disease (3) SVG to RCA? occluded  proximally  * LVEDP 19mmHg, no significant aortic stenosis on LV-Ao gradient  * Successful PCI to native distal RCA with one zotarolimus drug eluding  stent    Assessment/Plan   Mr. Brasher is a very pleasant 67 year old gentleman with PMH significant for HTN and CAD s/p CABG 2003 at [facility] (LIMA->LAD, SVG->OM1, SVG->RCA) who had sudden cardiac arrest 9/25/2014 (SVG-> RCA graft occluded, PCI to distal native RCA). Has has CRT-D for secondary prevention, s/p VT ablation 9/2023.  Doing well from CV standpoint--BP well controlled, NYHA II on exam.      Plan:  -continue current ASA, statin, Lasix, Coreg, amlodipine, losartan-HCTZ  -F/U in in 6 months or sooner if needed.         Camron Masters MD

## 2024-03-10 LAB
ATRIAL RATE: 60 BPM
P AXIS: 84 DEGREES
P OFFSET: 172 MS
P ONSET: 130 MS
PR INTERVAL: 154 MS
Q ONSET: 207 MS
QRS COUNT: 10 BEATS
QRS DURATION: 120 MS
QT INTERVAL: 470 MS
QTC CALCULATION(BAZETT): 470 MS
QTC FREDERICIA: 470 MS
R AXIS: 245 DEGREES
T AXIS: 32 DEGREES
T OFFSET: 442 MS
VENTRICULAR RATE: 60 BPM

## 2024-03-18 ENCOUNTER — LAB (OUTPATIENT)
Dept: LAB | Facility: LAB | Age: 69
End: 2024-03-18
Payer: MEDICARE

## 2024-03-18 DIAGNOSIS — E78.2 MIXED HYPERLIPIDEMIA: ICD-10-CM

## 2024-03-18 DIAGNOSIS — E11.65 TYPE 2 DIABETES MELLITUS WITH HYPERGLYCEMIA (MULTI): ICD-10-CM

## 2024-03-18 DIAGNOSIS — Z01.89 ENCOUNTER FOR OTHER SPECIFIED SPECIAL EXAMINATIONS: Primary | ICD-10-CM

## 2024-03-18 LAB
ALBUMIN SERPL-MCNC: 4.4 G/DL (ref 3.5–5)
ALP BLD-CCNC: 103 U/L (ref 35–125)
ALT SERPL-CCNC: 33 U/L (ref 5–40)
ANION GAP SERPL CALC-SCNC: 12 MMOL/L
AST SERPL-CCNC: 25 U/L (ref 5–40)
BASOPHILS # BLD AUTO: 0.06 X10*3/UL (ref 0–0.1)
BASOPHILS NFR BLD AUTO: 0.8 %
BILIRUB DIRECT SERPL-MCNC: <0.2 MG/DL (ref 0–0.2)
BILIRUB SERPL-MCNC: 0.6 MG/DL (ref 0.1–1.2)
BUN SERPL-MCNC: 19 MG/DL (ref 8–25)
CALCIUM SERPL-MCNC: 9.7 MG/DL (ref 8.5–10.4)
CHLORIDE SERPL-SCNC: 101 MMOL/L (ref 97–107)
CHOLEST SERPL-MCNC: 112 MG/DL (ref 133–200)
CHOLEST/HDLC SERPL: 3 {RATIO}
CO2 SERPL-SCNC: 30 MMOL/L (ref 24–31)
CREAT SERPL-MCNC: 1 MG/DL (ref 0.4–1.6)
EGFRCR SERPLBLD CKD-EPI 2021: 82 ML/MIN/1.73M*2
EOSINOPHIL # BLD AUTO: 0.37 X10*3/UL (ref 0–0.7)
EOSINOPHIL NFR BLD AUTO: 5 %
ERYTHROCYTE [DISTWIDTH] IN BLOOD BY AUTOMATED COUNT: 14.1 % (ref 11.5–14.5)
EST. AVERAGE GLUCOSE BLD GHB EST-MCNC: 140 MG/DL
GLUCOSE SERPL-MCNC: 134 MG/DL (ref 65–99)
HBA1C MFR BLD: 6.5 %
HCT VFR BLD AUTO: 42.5 % (ref 41–52)
HDLC SERPL-MCNC: 37 MG/DL
HGB BLD-MCNC: 14.3 G/DL (ref 13.5–17.5)
IMM GRANULOCYTES # BLD AUTO: 0.02 X10*3/UL (ref 0–0.7)
IMM GRANULOCYTES NFR BLD AUTO: 0.3 % (ref 0–0.9)
LDLC SERPL CALC-MCNC: 44 MG/DL (ref 65–130)
LYMPHOCYTES # BLD AUTO: 2.34 X10*3/UL (ref 1.2–4.8)
LYMPHOCYTES NFR BLD AUTO: 31.8 %
MCH RBC QN AUTO: 28.8 PG (ref 26–34)
MCHC RBC AUTO-ENTMCNC: 33.6 G/DL (ref 32–36)
MCV RBC AUTO: 86 FL (ref 80–100)
MONOCYTES # BLD AUTO: 0.64 X10*3/UL (ref 0.1–1)
MONOCYTES NFR BLD AUTO: 8.7 %
NEUTROPHILS # BLD AUTO: 3.92 X10*3/UL (ref 1.2–7.7)
NEUTROPHILS NFR BLD AUTO: 53.4 %
NRBC BLD-RTO: 0 /100 WBCS (ref 0–0)
PLATELET # BLD AUTO: 148 X10*3/UL (ref 150–450)
POTASSIUM SERPL-SCNC: 3.6 MMOL/L (ref 3.4–5.1)
PROT SERPL-MCNC: 7.1 G/DL (ref 5.9–7.9)
RBC # BLD AUTO: 4.96 X10*6/UL (ref 4.5–5.9)
SODIUM SERPL-SCNC: 143 MMOL/L (ref 133–145)
TRIGL SERPL-MCNC: 157 MG/DL (ref 40–150)
WBC # BLD AUTO: 7.4 X10*3/UL (ref 4.4–11.3)

## 2024-03-18 PROCEDURE — 80061 LIPID PANEL: CPT

## 2024-03-18 PROCEDURE — 85025 COMPLETE CBC W/AUTO DIFF WBC: CPT

## 2024-03-18 PROCEDURE — 80053 COMPREHEN METABOLIC PANEL: CPT

## 2024-03-18 PROCEDURE — 36415 COLL VENOUS BLD VENIPUNCTURE: CPT

## 2024-03-18 PROCEDURE — 82248 BILIRUBIN DIRECT: CPT

## 2024-03-18 PROCEDURE — 83036 HEMOGLOBIN GLYCOSYLATED A1C: CPT

## 2024-03-20 ENCOUNTER — OFFICE VISIT (OUTPATIENT)
Dept: PRIMARY CARE | Facility: CLINIC | Age: 69
End: 2024-03-20
Payer: MEDICARE

## 2024-03-20 VITALS
DIASTOLIC BLOOD PRESSURE: 80 MMHG | HEART RATE: 49 BPM | BODY MASS INDEX: 38.65 KG/M2 | OXYGEN SATURATION: 97 % | WEIGHT: 255 LBS | TEMPERATURE: 98.1 F | HEIGHT: 68 IN | SYSTOLIC BLOOD PRESSURE: 130 MMHG

## 2024-03-20 DIAGNOSIS — I10 PRIMARY HYPERTENSION: ICD-10-CM

## 2024-03-20 DIAGNOSIS — E55.9 VITAMIN D DEFICIENCY: ICD-10-CM

## 2024-03-20 DIAGNOSIS — Z23 ENCOUNTER FOR IMMUNIZATION: ICD-10-CM

## 2024-03-20 DIAGNOSIS — E66.01 CLASS 2 SEVERE OBESITY WITH SERIOUS COMORBIDITY AND BODY MASS INDEX (BMI) OF 38.0 TO 38.9 IN ADULT, UNSPECIFIED OBESITY TYPE (MULTI): ICD-10-CM

## 2024-03-20 DIAGNOSIS — D69.6 THROMBOCYTOPENIA (CMS-HCC): ICD-10-CM

## 2024-03-20 DIAGNOSIS — I25.10 CORONARY ARTERY DISEASE INVOLVING NATIVE CORONARY ARTERY OF NATIVE HEART WITHOUT ANGINA PECTORIS: ICD-10-CM

## 2024-03-20 DIAGNOSIS — E11.69 TYPE 2 DIABETES MELLITUS WITH OTHER SPECIFIED COMPLICATION, WITHOUT LONG-TERM CURRENT USE OF INSULIN (MULTI): ICD-10-CM

## 2024-03-20 DIAGNOSIS — K21.9 GASTROESOPHAGEAL REFLUX DISEASE WITHOUT ESOPHAGITIS: ICD-10-CM

## 2024-03-20 DIAGNOSIS — E78.2 MIXED HYPERLIPIDEMIA: ICD-10-CM

## 2024-03-20 DIAGNOSIS — Z00.00 ANNUAL PHYSICAL EXAM: Primary | ICD-10-CM

## 2024-03-20 DIAGNOSIS — Z12.12 ENCOUNTER FOR COLORECTAL CANCER SCREENING: ICD-10-CM

## 2024-03-20 DIAGNOSIS — Z71.89 COUNSELING REGARDING ADVANCED CARE PLANNING AND GOALS OF CARE: ICD-10-CM

## 2024-03-20 DIAGNOSIS — N40.0 BENIGN PROSTATIC HYPERPLASIA WITHOUT LOWER URINARY TRACT SYMPTOMS: ICD-10-CM

## 2024-03-20 DIAGNOSIS — Z01.89 ENCOUNTER FOR ROUTINE LABORATORY TESTING: ICD-10-CM

## 2024-03-20 DIAGNOSIS — I50.32 CHRONIC DIASTOLIC HEART FAILURE (MULTI): ICD-10-CM

## 2024-03-20 DIAGNOSIS — Z12.5 ENCOUNTER FOR PROSTATE CANCER SCREENING: ICD-10-CM

## 2024-03-20 DIAGNOSIS — F32.A DEPRESSION, UNSPECIFIED DEPRESSION TYPE: ICD-10-CM

## 2024-03-20 DIAGNOSIS — G47.33 OSA (OBSTRUCTIVE SLEEP APNEA): ICD-10-CM

## 2024-03-20 DIAGNOSIS — Z12.11 ENCOUNTER FOR COLORECTAL CANCER SCREENING: ICD-10-CM

## 2024-03-20 PROBLEM — I46.9 CARDIAC ARREST (MULTI): Status: RESOLVED | Noted: 2023-09-17 | Resolved: 2024-03-20

## 2024-03-20 PROBLEM — Z95.1 HISTORY OF CORONARY ARTERY BYPASS GRAFT: Status: ACTIVE | Noted: 2024-03-20

## 2024-03-20 PROBLEM — R14.0 ABDOMINAL DISTENSION: Status: RESOLVED | Noted: 2023-09-17 | Resolved: 2024-03-20

## 2024-03-20 PROBLEM — K85.90 PANCREATITIS (HHS-HCC): Status: RESOLVED | Noted: 2023-09-17 | Resolved: 2024-03-20

## 2024-03-20 PROBLEM — I25.2 HISTORY OF MYOCARDIAL INFARCTION: Status: ACTIVE | Noted: 2024-03-20

## 2024-03-20 PROBLEM — Z86.74 HISTORY OF CARDIAC ARREST: Status: ACTIVE | Noted: 2024-03-20

## 2024-03-20 PROBLEM — R60.9 EDEMA: Status: RESOLVED | Noted: 2023-09-17 | Resolved: 2024-03-20

## 2024-03-20 PROBLEM — E66.9 OBESITY: Status: ACTIVE | Noted: 2024-03-20

## 2024-03-20 PROBLEM — L30.9 ECZEMA: Status: RESOLVED | Noted: 2023-09-17 | Resolved: 2024-03-20

## 2024-03-20 PROBLEM — Z95.5 HISTORY OF CORONARY ARTERY STENT PLACEMENT: Status: ACTIVE | Noted: 2024-03-20

## 2024-03-20 PROCEDURE — G0439 PPPS, SUBSEQ VISIT: HCPCS | Performed by: STUDENT IN AN ORGANIZED HEALTH CARE EDUCATION/TRAINING PROGRAM

## 2024-03-20 PROCEDURE — 1158F ADVNC CARE PLAN TLK DOCD: CPT | Performed by: STUDENT IN AN ORGANIZED HEALTH CARE EDUCATION/TRAINING PROGRAM

## 2024-03-20 PROCEDURE — 1160F RVW MEDS BY RX/DR IN RCRD: CPT | Performed by: STUDENT IN AN ORGANIZED HEALTH CARE EDUCATION/TRAINING PROGRAM

## 2024-03-20 PROCEDURE — 1159F MED LIST DOCD IN RCRD: CPT | Performed by: STUDENT IN AN ORGANIZED HEALTH CARE EDUCATION/TRAINING PROGRAM

## 2024-03-20 PROCEDURE — 3075F SYST BP GE 130 - 139MM HG: CPT | Performed by: STUDENT IN AN ORGANIZED HEALTH CARE EDUCATION/TRAINING PROGRAM

## 2024-03-20 PROCEDURE — 3079F DIAST BP 80-89 MM HG: CPT | Performed by: STUDENT IN AN ORGANIZED HEALTH CARE EDUCATION/TRAINING PROGRAM

## 2024-03-20 PROCEDURE — 99214 OFFICE O/P EST MOD 30 MIN: CPT | Performed by: STUDENT IN AN ORGANIZED HEALTH CARE EDUCATION/TRAINING PROGRAM

## 2024-03-20 PROCEDURE — 1036F TOBACCO NON-USER: CPT | Performed by: STUDENT IN AN ORGANIZED HEALTH CARE EDUCATION/TRAINING PROGRAM

## 2024-03-20 PROCEDURE — 99215 OFFICE O/P EST HI 40 MIN: CPT | Performed by: STUDENT IN AN ORGANIZED HEALTH CARE EDUCATION/TRAINING PROGRAM

## 2024-03-20 PROCEDURE — 3048F LDL-C <100 MG/DL: CPT | Performed by: STUDENT IN AN ORGANIZED HEALTH CARE EDUCATION/TRAINING PROGRAM

## 2024-03-20 PROCEDURE — 3008F BODY MASS INDEX DOCD: CPT | Performed by: STUDENT IN AN ORGANIZED HEALTH CARE EDUCATION/TRAINING PROGRAM

## 2024-03-20 PROCEDURE — 90677 PCV20 VACCINE IM: CPT | Performed by: STUDENT IN AN ORGANIZED HEALTH CARE EDUCATION/TRAINING PROGRAM

## 2024-03-20 PROCEDURE — 1170F FXNL STATUS ASSESSED: CPT | Performed by: STUDENT IN AN ORGANIZED HEALTH CARE EDUCATION/TRAINING PROGRAM

## 2024-03-20 PROCEDURE — 1123F ACP DISCUSS/DSCN MKR DOCD: CPT | Performed by: STUDENT IN AN ORGANIZED HEALTH CARE EDUCATION/TRAINING PROGRAM

## 2024-03-20 PROCEDURE — 1126F AMNT PAIN NOTED NONE PRSNT: CPT | Performed by: STUDENT IN AN ORGANIZED HEALTH CARE EDUCATION/TRAINING PROGRAM

## 2024-03-20 PROCEDURE — 3044F HG A1C LEVEL LT 7.0%: CPT | Performed by: STUDENT IN AN ORGANIZED HEALTH CARE EDUCATION/TRAINING PROGRAM

## 2024-03-20 RX ORDER — FAMOTIDINE 20 MG/1
20 TABLET, FILM COATED ORAL DAILY
Qty: 90 TABLET | Refills: 3 | Status: SHIPPED | OUTPATIENT
Start: 2024-03-20 | End: 2025-03-20

## 2024-03-20 RX ORDER — MAGNESIUM HYDROXIDE 400 MG/5ML
2 SUSPENSION, ORAL (FINAL DOSE FORM) ORAL DAILY
Start: 2024-03-20

## 2024-03-20 RX ORDER — METFORMIN HYDROCHLORIDE 500 MG/1
500 TABLET, EXTENDED RELEASE ORAL
Qty: 90 TABLET | Refills: 3 | Status: SHIPPED | OUTPATIENT
Start: 2024-03-20 | End: 2025-03-20

## 2024-03-20 RX ORDER — CARVEDILOL 25 MG/1
25 TABLET ORAL 2 TIMES DAILY
Qty: 180 TABLET | Refills: 3 | Status: SHIPPED | OUTPATIENT
Start: 2024-03-20 | End: 2024-06-11 | Stop reason: SDUPTHER

## 2024-03-20 RX ORDER — DOXAZOSIN 8 MG/1
8 TABLET ORAL NIGHTLY
Qty: 90 TABLET | Refills: 3 | Status: SHIPPED | OUTPATIENT
Start: 2024-03-20

## 2024-03-20 RX ORDER — NITROGLYCERIN 0.4 MG/1
0.4 TABLET SUBLINGUAL EVERY 5 MIN PRN
Qty: 30 TABLET | Refills: 0 | Status: SHIPPED | OUTPATIENT
Start: 2024-03-20

## 2024-03-20 RX ORDER — FUROSEMIDE 20 MG/1
20 TABLET ORAL DAILY
Qty: 90 TABLET | Refills: 3 | Status: SHIPPED | OUTPATIENT
Start: 2024-03-20 | End: 2024-06-10

## 2024-03-20 RX ORDER — ASPIRIN 81 MG/1
81 TABLET ORAL DAILY
Start: 2024-03-20

## 2024-03-20 RX ORDER — AMLODIPINE BESYLATE 5 MG/1
5 TABLET ORAL DAILY
Qty: 90 TABLET | Refills: 3 | Status: SHIPPED | OUTPATIENT
Start: 2024-03-20 | End: 2025-03-20

## 2024-03-20 RX ORDER — LOSARTAN POTASSIUM AND HYDROCHLOROTHIAZIDE 25; 100 MG/1; MG/1
1 TABLET ORAL DAILY
Qty: 90 TABLET | Refills: 3 | Status: SHIPPED | OUTPATIENT
Start: 2024-03-20 | End: 2025-03-20

## 2024-03-20 RX ORDER — TIRZEPATIDE 2.5 MG/.5ML
2.5 INJECTION, SOLUTION SUBCUTANEOUS
Qty: 2 ML | Refills: 0 | Status: SHIPPED | OUTPATIENT
Start: 2024-03-20 | End: 2024-03-26 | Stop reason: SDUPTHER

## 2024-03-20 RX ORDER — ATORVASTATIN CALCIUM 80 MG/1
80 TABLET, FILM COATED ORAL DAILY
Qty: 90 TABLET | Refills: 3 | Status: SHIPPED | OUTPATIENT
Start: 2024-03-20 | End: 2025-03-20

## 2024-03-20 ASSESSMENT — ACTIVITIES OF DAILY LIVING (ADL)
BATHING: INDEPENDENT
GROCERY_SHOPPING: INDEPENDENT
TAKING_MEDICATION: INDEPENDENT
MANAGING_FINANCES: INDEPENDENT
DOING_HOUSEWORK: INDEPENDENT
DRESSING: INDEPENDENT

## 2024-03-20 ASSESSMENT — ENCOUNTER SYMPTOMS
DEPRESSION: 0
ALLERGIC/IMMUNOLOGIC NEGATIVE: 1
ENDOCRINE NEGATIVE: 1
EYES NEGATIVE: 1
CONSTITUTIONAL NEGATIVE: 1
LOSS OF SENSATION IN FEET: 0
CARDIOVASCULAR NEGATIVE: 1
PSYCHIATRIC NEGATIVE: 1
OCCASIONAL FEELINGS OF UNSTEADINESS: 0
NEUROLOGICAL NEGATIVE: 1
RESPIRATORY NEGATIVE: 1
GASTROINTESTINAL NEGATIVE: 1
HEMATOLOGIC/LYMPHATIC NEGATIVE: 1
MUSCULOSKELETAL NEGATIVE: 1

## 2024-03-20 ASSESSMENT — LIFESTYLE VARIABLES
HOW OFTEN DO YOU HAVE A DRINK CONTAINING ALCOHOL: NEVER
AUDIT TOTAL SCORE: 0
HOW OFTEN DURING THE LAST YEAR HAVE YOU FAILED TO DO WHAT WAS NORMALLY EXPECTED FROM YOU BECAUSE OF DRINKING: NEVER
HAVE YOU OR SOMEONE ELSE BEEN INJURED AS A RESULT OF YOUR DRINKING: NO
AUDIT-C TOTAL SCORE: 0
HAS A RELATIVE, FRIEND, DOCTOR, OR ANOTHER HEALTH PROFESSIONAL EXPRESSED CONCERN ABOUT YOUR DRINKING OR SUGGESTED YOU CUT DOWN: NO
HOW OFTEN DURING THE LAST YEAR HAVE YOU NEEDED AN ALCOHOLIC DRINK FIRST THING IN THE MORNING TO GET YOURSELF GOING AFTER A NIGHT OF HEAVY DRINKING: NEVER
HOW OFTEN DO YOU HAVE SIX OR MORE DRINKS ON ONE OCCASION: NEVER
SKIP TO QUESTIONS 9-10: 1
HOW OFTEN DURING THE LAST YEAR HAVE YOU FOUND THAT YOU WERE NOT ABLE TO STOP DRINKING ONCE YOU HAD STARTED: NEVER
HOW OFTEN DURING THE LAST YEAR HAVE YOU HAD A FEELING OF GUILT OR REMORSE AFTER DRINKING: NEVER
HOW MANY STANDARD DRINKS CONTAINING ALCOHOL DO YOU HAVE ON A TYPICAL DAY: PATIENT DOES NOT DRINK
HOW OFTEN DURING THE LAST YEAR HAVE YOU BEEN UNABLE TO REMEMBER WHAT HAPPENED THE NIGHT BEFORE BECAUSE YOU HAD BEEN DRINKING: NEVER

## 2024-03-20 ASSESSMENT — PATIENT HEALTH QUESTIONNAIRE - PHQ9
2. FEELING DOWN, DEPRESSED OR HOPELESS: NOT AT ALL
SUM OF ALL RESPONSES TO PHQ9 QUESTIONS 1 AND 2: 0
1. LITTLE INTEREST OR PLEASURE IN DOING THINGS: NOT AT ALL

## 2024-03-20 ASSESSMENT — PAIN SCALES - GENERAL: PAINLEVEL: 0-NO PAIN

## 2024-03-20 NOTE — PATIENT INSTRUCTIONS
Diagnoses and all orders for this visit:  Annual physical exam  -     Follow Up In Primary Care; Future  Counseling regarding advanced care planning and goals of care  Comments:  Encouraged the patient to confirm that Living Will and Healthcare Power of  (HCPoA) are accurate and up to date.  Encounter for colorectal cancer screening  Encounter for routine laboratory testing  -     CBC and Auto Differential; Future  -     Basic Metabolic Panel; Future  -     Hepatic Function Panel; Future  -     Hemoglobin A1C; Future  -     Vitamin D 25-Hydroxy,Total (for eval of Vitamin D levels); Future  -     Prostate Specific Antigen; Future  -     Albumin , Urine Random; Future  Vitamin D deficiency  -     Vitamin D 25-Hydroxy,Total (for eval of Vitamin D levels); Future  Encounter for prostate cancer screening  -     Prostate Specific Antigen; Future  Encounter for immunization  -     Pneumococcal conjugate vaccine, 20-valent (PREVNAR 20)  Class 2 severe obesity with serious comorbidity and body mass index (BMI) of 38.0 to 38.9 in adult, unspecified obesity type (CMS/HCC)  -     Referral to Adult Sleep Medicine; Future  -     tirzepatide (Mounjaro) 2.5 mg/0.5 mL pen injector; Inject 2.5 mg under the skin 1 (one) time per week.  -     Hepatic Function Panel; Future  Type 2 diabetes mellitus with other specified complication, without long-term current use of insulin (CMS/[facility])  -     tirzepatide (Mounjaro) 2.5 mg/0.5 mL pen injector; Inject 2.5 mg under the skin 1 (one) time per week.  -     Hemoglobin A1C; Future  -     Albumin , Urine Random; Future  -     metFORMIN  mg 24 hr tablet; Take 1 tablet (500 mg) by mouth once daily in the evening. Take with meals.  Coronary artery disease involving native coronary artery of native heart without angina pectoris  -     aspirin 81 mg EC tablet; Take 1 tablet (81 mg) by mouth once daily.  -     carvedilol (Coreg) 25 mg tablet; Take 1 tablet (25 mg) by mouth 2 times a  day.  -     nitroglycerin (Nitrostat) 0.4 mg SL tablet; Place 1 tablet (0.4 mg) under the tongue every 5 minutes if needed for chest pain.  -     losartan-hydrochlorothiazide (Hyzaar) 100-25 mg tablet; Take 1 tablet by mouth once daily.  -     atorvastatin (Lipitor) 80 mg tablet; Take 1 tablet (80 mg) by mouth once daily.  -     potassium gluconate 595 mg (99 mg) tablet; Take 2 tablets by mouth once daily.  Benign prostatic hyperplasia without lower urinary tract symptoms  Gastroesophageal reflux disease without esophagitis  -     famotidine (Pepcid) 20 mg tablet; Take 1 tablet (20 mg) by mouth once daily.  Chronic diastolic heart failure (CMS/HCC)  -     carvedilol (Coreg) 25 mg tablet; Take 1 tablet (25 mg) by mouth 2 times a day.  -     losartan-hydrochlorothiazide (Hyzaar) 100-25 mg tablet; Take 1 tablet by mouth once daily.  -     furosemide (Lasix) 20 mg tablet; Take 1 tablet (20 mg) by mouth once daily.  -     potassium gluconate 595 mg (99 mg) tablet; Take 2 tablets by mouth once daily.  Depression, unspecified depression type  Thrombocytopenia (CMS/HCC)  -     CBC and Auto Differential; Future  HAYDEN (obstructive sleep apnea)  -     Referral to Adult Sleep Medicine; Future  Mixed hyperlipidemia  -     losartan-hydrochlorothiazide (Hyzaar) 100-25 mg tablet; Take 1 tablet by mouth once daily.  -     atorvastatin (Lipitor) 80 mg tablet; Take 1 tablet (80 mg) by mouth once daily.  Primary hypertension  -     Basic Metabolic Panel; Future  -     carvedilol (Coreg) 25 mg tablet; Take 1 tablet (25 mg) by mouth 2 times a day.  -     amLODIPine (Norvasc) 5 mg tablet; Take 1 tablet (5 mg) by mouth once daily.  -     doxazosin (Cardura) 8 mg tablet; Take 1 tablet (8 mg) by mouth once daily at bedtime.  -     potassium gluconate 595 mg (99 mg) tablet; Take 2 tablets by mouth once daily.     Discussed routine and/or preventative care with the patient as outlined below:  - Labwork:   - Patient had labwork done for this  appointment. Discussed today.   - Everything looked great. Do not need to make significant changes at this time.  - Will order labwork for the patient to get one week prior to the next appointment.  - Imaging:   - Colorectal Cancer: Patient is due, has already been notified by gastroenterology. Is planning on following up with them.  - Immunizations:   - Encouraged the patient to get the pneumonia (prevnar-20) immunization.  - Encouraged the patient to get their shingles (shingrix) immunizations.  - Discussed seasonal immunizations, including the influenza and COVID-19 immunizations.   - Significant medication and problem list reconciliation done today.  - Encouraged diet and exercise modification. Patient notes that he has been making significant dietary and exercise changes (including moderate aerobic exercise for roughly 150 minutes / week minimum) without significant improvement in weight. I suspect that the patient is dealing with metabolic syndrome, and I strongly suspect that the patient would do well on a GLP-1 (ozempic, mounjaro). Will attempt to get these approved because I also suspect that this would greatly help the patient's sleep apnea and overall quality of life.  - Encouraged continued use of CPAP; however, the patient notes that he is not currently doing this since he has never been able to get comfortable with the mask. Will refer to a new sleep medicine physician for further evaluation.  - Patient's vitals look great. Do not need to make any changes at this time.  - Patient's sugars are well-controlled. Discussed the addition of a GLP-1 for support with weight and sugar control.  - Patient notes mental health is in a good spot. Do not need to make changes at this time.

## 2024-03-20 NOTE — PROGRESS NOTES
[facility]: [facility]  MEDICARE WELLNESS EXAM      Homero MAYE Brasher is a 68 y.o. male that is presenting today for CPE.    Assessment/Plan    Diagnoses and all orders for this visit:  Annual physical exam  -     Follow Up In Primary Care; Future  Counseling regarding advanced care planning and goals of care  Comments:  Encouraged the patient to confirm that Living Will and Healthcare Power of  (HCPoA) are accurate and up to date.  Encounter for colorectal cancer screening  Encounter for routine laboratory testing  -     CBC and Auto Differential; Future  -     Basic Metabolic Panel; Future  -     Hepatic Function Panel; Future  -     Hemoglobin A1C; Future  -     Vitamin D 25-Hydroxy,Total (for eval of Vitamin D levels); Future  -     Prostate Specific Antigen; Future  -     Albumin , Urine Random; Future  Vitamin D deficiency  -     Vitamin D 25-Hydroxy,Total (for eval of Vitamin D levels); Future  Encounter for prostate cancer screening  -     Prostate Specific Antigen; Future  Encounter for immunization  -     Pneumococcal conjugate vaccine, 20-valent (PREVNAR 20)  Class 2 severe obesity with serious comorbidity and body mass index (BMI) of 38.0 to 38.9 in adult, unspecified obesity type (CMS/[facility])  -     Referral to Adult Sleep Medicine; Future  -     tirzepatide (Mounjaro) 2.5 mg/0.5 mL pen injector; Inject 2.5 mg under the skin 1 (one) time per week.  -     Hepatic Function Panel; Future  Type 2 diabetes mellitus with other specified complication, without long-term current use of insulin (CMS/[facility])  -     tirzepatide (Mounjaro) 2.5 mg/0.5 mL pen injector; Inject 2.5 mg under the skin 1 (one) time per week.  -     Hemoglobin A1C; Future  -     Albumin , Urine Random; Future  -     metFORMIN  mg 24 hr tablet; Take 1 tablet (500 mg) by mouth once daily in the evening. Take with meals.  Coronary artery disease involving native coronary artery of native heart without angina  pectoris  -     aspirin 81 mg EC tablet; Take 1 tablet (81 mg) by mouth once daily.  -     carvedilol (Coreg) 25 mg tablet; Take 1 tablet (25 mg) by mouth 2 times a day.  -     nitroglycerin (Nitrostat) 0.4 mg SL tablet; Place 1 tablet (0.4 mg) under the tongue every 5 minutes if needed for chest pain.  -     losartan-hydrochlorothiazide (Hyzaar) 100-25 mg tablet; Take 1 tablet by mouth once daily.  -     atorvastatin (Lipitor) 80 mg tablet; Take 1 tablet (80 mg) by mouth once daily.  -     potassium gluconate 595 mg (99 mg) tablet; Take 2 tablets by mouth once daily.  Benign prostatic hyperplasia without lower urinary tract symptoms  Gastroesophageal reflux disease without esophagitis  -     famotidine (Pepcid) 20 mg tablet; Take 1 tablet (20 mg) by mouth once daily.  Chronic diastolic heart failure (CMS/HCC)  -     carvedilol (Coreg) 25 mg tablet; Take 1 tablet (25 mg) by mouth 2 times a day.  -     losartan-hydrochlorothiazide (Hyzaar) 100-25 mg tablet; Take 1 tablet by mouth once daily.  -     furosemide (Lasix) 20 mg tablet; Take 1 tablet (20 mg) by mouth once daily.  -     potassium gluconate 595 mg (99 mg) tablet; Take 2 tablets by mouth once daily.  Depression, unspecified depression type  Thrombocytopenia (CMS/HCC)  -     CBC and Auto Differential; Future  HAYDEN (obstructive sleep apnea)  -     Referral to Adult Sleep Medicine; Future  Mixed hyperlipidemia  -     losartan-hydrochlorothiazide (Hyzaar) 100-25 mg tablet; Take 1 tablet by mouth once daily.  -     atorvastatin (Lipitor) 80 mg tablet; Take 1 tablet (80 mg) by mouth once daily.  Primary hypertension  -     Basic Metabolic Panel; Future  -     carvedilol (Coreg) 25 mg tablet; Take 1 tablet (25 mg) by mouth 2 times a day.  -     amLODIPine (Norvasc) 5 mg tablet; Take 1 tablet (5 mg) by mouth once daily.  -     doxazosin (Cardura) 8 mg tablet; Take 1 tablet (8 mg) by mouth once daily at bedtime.  -     potassium gluconate 595 mg (99 mg) tablet; Take 2  tablets by mouth once daily.    Discussed routine and/or preventative care with the patient as outlined below:  - Labwork:   - Patient had labwork done for this appointment. Discussed today.   - Everything looked great. Do not need to make significant changes at this time.  - Will order labwork for the patient to get one week prior to the next appointment.  - Imaging:   - Colorectal Cancer: Patient is due, has already been notified by gastroenterology. Is planning on following up with them.  - Immunizations:   - Encouraged the patient to get the pneumonia (prevnar-20) immunization.  - Encouraged the patient to get their shingles (shingrix) immunizations.  - Discussed seasonal immunizations, including the influenza and COVID-19 immunizations.   - Significant medication and problem list reconciliation done today.  - Encouraged diet and exercise modification. Patient notes that he has been making significant dietary and exercise changes (including moderate aerobic exercise for roughly 150 minutes / week minimum) without significant improvement in weight. I suspect that the patient is dealing with metabolic syndrome, and I strongly suspect that the patient would do well on a GLP-1 (ozempic, mounjaro). Will attempt to get these approved because I also suspect that this would greatly help the patient's sleep apnea and overall quality of life.  - Encouraged continued use of CPAP; however, the patient notes that he is not currently doing this since he has never been able to get comfortable with the mask. Will refer to a new sleep medicine physician for further evaluation.  - Patient's vitals look great. Do not need to make any changes at this time.  - Patient's sugars are well-controlled. Discussed the addition of a GLP-1 for support with weight and sugar control.  - Patient notes mental health is in a good spot. Do not need to make changes at this time.    ADVANCED CARE PLANNING  Advanced Care Planning was discussed with  patient:  The patient has an active advanced care plan on file. The patient has an active surrogate decision-maker on file.  Encouraged the patient to confirm that Living Will and Healthcare Power of  (HCPoA) are accurate and up to date.  Encouraged the patient to confirm that our office be provided a copy of any documentation in the event that anything changes.    ACTIVITIES OF DAILY LIVING  Basic ADLs:  Bathing: Independent, Dressing: Independent, Toileting: Independent, Transferring: Independent, Continence: Independent, Feeding: Independent.    Instrumental ADLs:  Ability to use phone: Independent, Shopping: Independent, Cooking: Independent, House-keeping: Independent, Laundry: Independent, Transportation: Independent, Medication Management: Independent, Finance Management: Independent.    Subjective   - The patient otherwise feels well and denies any acute symptoms or concerns at this time.  - The patient denies any changes or progression of their chronic medical problems.  - The patient denies any problems or concerns with their medications.      Review of Systems   Constitutional: Negative.    HENT: Negative.     Eyes: Negative.    Respiratory: Negative.     Cardiovascular: Negative.    Gastrointestinal: Negative.    Endocrine: Negative.    Genitourinary: Negative.    Musculoskeletal: Negative.    Skin: Negative.    Allergic/Immunologic: Negative.    Neurological: Negative.    Hematological: Negative.    Psychiatric/Behavioral: Negative.     All other systems reviewed and are negative.    Objective   Vitals:    03/20/24 1042   BP: 130/80   Pulse: (!) 49   Temp: 36.7 °C (98.1 °F)   SpO2: 97%      Body mass index is 38.77 kg/m².  Physical Exam  Vitals and nursing note reviewed.   Constitutional:       General: He is not in acute distress.     Appearance: Normal appearance. He is not ill-appearing.   HENT:      Head: Normocephalic and atraumatic.      Right Ear: Tympanic membrane, ear canal and external  ear normal. There is no impacted cerumen.      Left Ear: Tympanic membrane, ear canal and external ear normal. There is no impacted cerumen.      Nose: Nose normal.      Mouth/Throat:      Mouth: Mucous membranes are moist.      Pharynx: Oropharynx is clear. No oropharyngeal exudate or posterior oropharyngeal erythema.   Eyes:      General: No scleral icterus.        Right eye: No discharge.         Left eye: No discharge.      Extraocular Movements: Extraocular movements intact.      Conjunctiva/sclera: Conjunctivae normal.      Pupils: Pupils are equal, round, and reactive to light.   Neck:      Vascular: No carotid bruit.   Cardiovascular:      Rate and Rhythm: Normal rate and regular rhythm.      Pulses: Normal pulses.      Heart sounds: Normal heart sounds. No murmur heard.     No friction rub. No gallop.   Pulmonary:      Effort: Pulmonary effort is normal. No respiratory distress.      Breath sounds: Normal breath sounds.   Abdominal:      General: Abdomen is flat. Bowel sounds are normal.      Palpations: Abdomen is soft.      Tenderness: There is no abdominal tenderness.      Hernia: No hernia is present.   Musculoskeletal:         General: No swelling. Normal range of motion.      Cervical back: Normal range of motion.   Lymphadenopathy:      Cervical: No cervical adenopathy.   Skin:     General: Skin is warm and dry.      Coloration: Skin is not jaundiced.      Findings: No rash.   Neurological:      General: No focal deficit present.      Mental Status: He is alert and oriented to person, place, and time. Mental status is at baseline.   Psychiatric:         Mood and Affect: Mood normal.         Behavior: Behavior normal.       Diagnostic Results   Lab Results   Component Value Date    GLUCOSE 134 (H) 03/18/2024    CALCIUM 9.7 03/18/2024     03/18/2024    K 3.6 03/18/2024    CO2 30 03/18/2024     03/18/2024    BUN 19 03/18/2024    CREATININE 1.00 03/18/2024     Lab Results   Component Value  "Date    ALT 33 03/18/2024    AST 25 03/18/2024    ALKPHOS 103 03/18/2024    BILITOT 0.6 03/18/2024     Lab Results   Component Value Date    WBC 7.4 03/18/2024    HGB 14.3 03/18/2024    HCT 42.5 03/18/2024    MCV 86 03/18/2024     (L) 03/18/2024     Lab Results   Component Value Date    CHOL 112 (L) 03/18/2024    CHOL 93 (L) 09/11/2023    CHOL 100 (L) 12/14/2022     Lab Results   Component Value Date    HDL 37.0 (L) 03/18/2024    HDL 32 (L) 09/11/2023    HDL 33 (L) 12/14/2022     Lab Results   Component Value Date    LDLCALC 44 (L) 03/18/2024    LDLCALC 42 (L) 09/11/2023    LDLCALC 43 (L) 12/14/2022     Lab Results   Component Value Date    TRIG 157 (H) 03/18/2024    TRIG 94 09/11/2023    TRIG 118 12/14/2022     No components found for: \"CHOLHDL\"  Lab Results   Component Value Date    HGBA1C 6.5 (H) 03/18/2024     Other labs not included in the list above reviewed either before or during this encounter.    History   Past Medical History:   Diagnosis Date    Presence of cardiac pacemaker 03/01/2016    History of cardiac pacemaker     Past Surgical History:   Procedure Laterality Date    CHOLECYSTECTOMY  10/20/2014    Cholecystectomy    CORONARY ARTERY BYPASS GRAFT  10/20/2014    CABG    OTHER SURGICAL HISTORY  01/05/2022    Permanent pacemaker insertion     Family History   Problem Relation Name Age of Onset    Other (CVA) Father      Stroke Father      Esophageal cancer Brother       Social History     Socioeconomic History    Marital status: Single     Spouse name: Not on file    Number of children: Not on file    Years of education: Not on file    Highest education level: Not on file   Occupational History    Not on file   Tobacco Use    Smoking status: Never    Smokeless tobacco: Never   Substance and Sexual Activity    Alcohol use: Not Currently    Drug use: Never    Sexual activity: Not on file   Other Topics Concern    Not on file   Social History Narrative    Not on file     Social Determinants of " Health     Financial Resource Strain: Not on file   Food Insecurity: Not on file   Transportation Needs: Not on file   Physical Activity: Not on file   Stress: Not on file   Social Connections: Not on file   Intimate Partner Violence: Not on file   Housing Stability: Not on file     Allergies   Allergen Reactions    Aspirin Unknown     Current Outpatient Medications on File Prior to Visit   Medication Sig Dispense Refill    flaxseed oiL 1,000 mg capsule TAKE AS DIRECTED.      multivit-min/ferrous fumarate (MULTI VITAMIN ORAL) as directed Orally      [DISCONTINUED] amLODIPine (Norvasc) 5 mg tablet Take 1 tablet (5 mg) by mouth once daily.      [DISCONTINUED] aspirin 81 mg EC tablet 1 tablet Orally Once a day      [DISCONTINUED] atorvastatin (Lipitor) 80 mg tablet Take 1 tablet (80 mg) by mouth once daily. 30 tablet 3    [DISCONTINUED] carvedilol (Coreg) 25 mg tablet TAKE 1 TABLET BY MOUTH 2 TIMES A DAY for 90 90 tablet 3    [DISCONTINUED] doxazosin (Cardura) 8 mg tablet Take 1 tablet (8 mg) by mouth once daily. 90 tablet 3    [DISCONTINUED] famotidine (Pepcid) 20 mg tablet Take 1 tablet (20 mg) by mouth once daily.      [DISCONTINUED] furosemide (Lasix) 20 mg tablet Take 1 tablet (20 mg) by mouth every 12 hours. 180 tablet 3    [DISCONTINUED] losartan-hydrochlorothiazide (Hyzaar) 100-25 mg tablet Take 1 tablet by mouth once daily.      [DISCONTINUED] metFORMIN  mg 24 hr tablet 1 tablet with evening meal Orally Once a day for 90 days 90 tablet 3    [DISCONTINUED] nitroglycerin (Nitrostat) 0.4 mg SL tablet 1 tablet under the tongue Sublingual prn      [DISCONTINUED] potassium gluconate 595 mg (99 mg) tablet 2 tablets Orally Once a day       No current facility-administered medications on file prior to visit.     Immunization History   Administered Date(s) Administered    Flu vaccine (IIV4), preservative free *Check age/dose* 09/14/2017    Flu vaccine, quadrivalent, high-dose, preservative free, age 65y+ (FLUZONE)  09/30/2020, 12/08/2021, 10/12/2022, 10/03/2023    Flu vaccine, quadrivalent, recombinant, preservative free, adult (FLUBLOK) 09/21/2020    Influenza, injectable, MDCK, quadrivalent 10/15/2019    Influenza, injectable, quadrivalent 10/06/2016, 10/22/2018, 10/01/2019    Influenza, seasonal, injectable 10/13/2010, 12/17/2011, 12/15/2012, 01/18/2014, 09/22/2014    Moderna SARS-CoV-2 Vaccination 04/16/2021, 05/13/2021    Pneumococcal conjugate vaccine, 13-valent (PREVNAR 13) 09/19/2016    Pneumococcal polysaccharide vaccine, 23-valent, age 2 years and older (PNEUMOVAX 23) 10/01/2004    Td vaccine, age 7 years and older (TDVAX) 05/01/2006    Tdap vaccine, age 7 year and older (BOOSTRIX, ADACEL) 09/19/2016     Patient's medical history was reviewed and updated either before or during this encounter.     Satish Sandoval MD

## 2024-03-21 ENCOUNTER — TELEPHONE (OUTPATIENT)
Dept: PRIMARY CARE | Facility: CLINIC | Age: 69
End: 2024-03-21
Payer: MEDICARE

## 2024-03-22 DIAGNOSIS — E66.01 CLASS 2 SEVERE OBESITY WITH SERIOUS COMORBIDITY AND BODY MASS INDEX (BMI) OF 38.0 TO 38.9 IN ADULT, UNSPECIFIED OBESITY TYPE (MULTI): ICD-10-CM

## 2024-03-22 DIAGNOSIS — E11.69 TYPE 2 DIABETES MELLITUS WITH OTHER SPECIFIED COMPLICATION, WITHOUT LONG-TERM CURRENT USE OF INSULIN (MULTI): Primary | ICD-10-CM

## 2024-03-26 ENCOUNTER — TELEMEDICINE (OUTPATIENT)
Dept: PHARMACY | Facility: HOSPITAL | Age: 69
End: 2024-03-26
Payer: MEDICARE

## 2024-03-26 DIAGNOSIS — E11.69 TYPE 2 DIABETES MELLITUS WITH OTHER SPECIFIED COMPLICATION, WITHOUT LONG-TERM CURRENT USE OF INSULIN (MULTI): ICD-10-CM

## 2024-03-26 DIAGNOSIS — E66.01 CLASS 2 SEVERE OBESITY WITH SERIOUS COMORBIDITY AND BODY MASS INDEX (BMI) OF 38.0 TO 38.9 IN ADULT, UNSPECIFIED OBESITY TYPE (MULTI): ICD-10-CM

## 2024-03-26 NOTE — PROGRESS NOTES
MEDICATION MANAGEMENT    Homero Brasher is a 68 y.o. male who was referred by Satish Sandoval MD to complete medication reconciliation and review with the clinical pharmacist.  A comprehensive medication review was completed with the patient via telephone today.  Patient reports financial barrier with current medication.      MEDICATION HISTORY  Allergies   Allergen Reactions    Aspirin Unknown     Current Outpatient Medications on File Prior to Visit   Medication Sig Dispense Refill    amLODIPine (Norvasc) 5 mg tablet Take 1 tablet (5 mg) by mouth once daily. 90 tablet 3    aspirin 81 mg EC tablet Take 1 tablet (81 mg) by mouth once daily.      atorvastatin (Lipitor) 80 mg tablet Take 1 tablet (80 mg) by mouth once daily. 90 tablet 3    carvedilol (Coreg) 25 mg tablet Take 1 tablet (25 mg) by mouth 2 times a day. 180 tablet 3    doxazosin (Cardura) 8 mg tablet Take 1 tablet (8 mg) by mouth once daily at bedtime. 90 tablet 3    famotidine (Pepcid) 20 mg tablet Take 1 tablet (20 mg) by mouth once daily. 90 tablet 3    flaxseed oiL 1,000 mg capsule TAKE AS DIRECTED.      furosemide (Lasix) 20 mg tablet Take 1 tablet (20 mg) by mouth once daily. 90 tablet 3    losartan-hydrochlorothiazide (Hyzaar) 100-25 mg tablet Take 1 tablet by mouth once daily. 90 tablet 3    metFORMIN  mg 24 hr tablet Take 1 tablet (500 mg) by mouth once daily in the evening. Take with meals. 90 tablet 3    multivit-min/ferrous fumarate (MULTI VITAMIN ORAL) as directed Orally      nitroglycerin (Nitrostat) 0.4 mg SL tablet Place 1 tablet (0.4 mg) under the tongue every 5 minutes if needed for chest pain. 30 tablet 0    potassium gluconate 595 mg (99 mg) tablet Take 2 tablets by mouth once daily.      tirzepatide (Mounjaro) 2.5 mg/0.5 mL pen injector Inject 2.5 mg under the skin 1 (one) time per week. 2 mL 0    [DISCONTINUED] amLODIPine (Norvasc) 5 mg tablet Take 1 tablet (5 mg) by mouth once daily.      [DISCONTINUED] aspirin 81 mg EC  tablet 1 tablet Orally Once a day      [DISCONTINUED] atorvastatin (Lipitor) 80 mg tablet Take 1 tablet (80 mg) by mouth once daily. 30 tablet 3    [DISCONTINUED] carvedilol (Coreg) 25 mg tablet TAKE 1 TABLET BY MOUTH 2 TIMES A DAY for 90 90 tablet 3    [DISCONTINUED] doxazosin (Cardura) 8 mg tablet Take 1 tablet (8 mg) by mouth once daily. 90 tablet 3    [DISCONTINUED] famotidine (Pepcid) 20 mg tablet Take 1 tablet (20 mg) by mouth once daily.      [DISCONTINUED] furosemide (Lasix) 20 mg tablet Take 1 tablet (20 mg) by mouth every 12 hours. 180 tablet 3    [DISCONTINUED] losartan-hydrochlorothiazide (Hyzaar) 100-25 mg tablet Take 1 tablet by mouth once daily.      [DISCONTINUED] metFORMIN  mg 24 hr tablet 1 tablet with evening meal Orally Once a day for 90 days 90 tablet 3    [DISCONTINUED] nitroglycerin (Nitrostat) 0.4 mg SL tablet 1 tablet under the tongue Sublingual prn      [DISCONTINUED] potassium gluconate 595 mg (99 mg) tablet 2 tablets Orally Once a day       No current facility-administered medications on file prior to visit.        Patient Assistance Screening (VAF)  Patient verbally reports monthly or yearly income which is less than 400% federal poverty level.  Application for program has been submitted for the following medications: Mounjaro  Patient has been informed that program team will be reaching out to them to discuss necessary documentation, instructed to answer phone/return voicemail.   Patient aware this process may take up to 6 weeks.   If approved medication must be filled through [facility] pharmacy and may be picked up or mailed to patient.       LABS  There were no vitals taken for this visit.   Lab Results   Component Value Date    HGBA1C 6.5 (H) 03/18/2024    HGBA1C 6.3 (H) 09/11/2023    HGBA1C 6.2 (H) 12/14/2022     Lab Results   Component Value Date    BILITOT 0.6 03/18/2024    CALCIUM 9.7 03/18/2024    CO2 30 03/18/2024     03/18/2024    CREATININE 1.00 03/18/2024     GLUCOSE 134 (H) 03/18/2024    ALKPHOS 103 03/18/2024    K 3.6 03/18/2024    PROT 7.1 03/18/2024     03/18/2024    AST 25 03/18/2024    ALT 33 03/18/2024    BUN 19 03/18/2024    ANIONGAP 12 03/18/2024    ALBUMIN 4.4 03/18/2024    GFRMALE 63 08/24/2023     Lab Results   Component Value Date    TRIG 157 (H) 03/18/2024    CHOL 112 (L) 03/18/2024    LDLCALC 44 (L) 03/18/2024    HDL 37.0 (L) 03/18/2024         Assessment/Plan    Comments/Recommendations to PCP:  Mounjaro Education:     - Counseled patient on MOA, expectations, side effects, duration of therapy, contraindications, administration, and monitoring parameters  - Answered all patient questions and concerns  - Counseled patient on Mounjaro MOA, expectations, side effects, duration of therapy, administration, and monitoring parameters.  - Provided detailed dosing and administration counseling to ensure proper technique.   - Reviewed Mounjaro titration schedule, starting with 2.5 mg once weekly to a goal of 15 mg once weekly if tolerated  - Counseled patient on the benefits of GLP-1ra glycemic control and weight loss  - Reviewed storage requirements of Mounjaro when not in use, and when to administer the medication if a dose is missed.  - Advised patient that they may experience improved satiety after meals and portion sizes of meals may be reduced as doses of Mounjaro increase.    Provided medication counseling and answered patient's medication questions.     Yobani Santana, PharmD, BCPS    Verbal consent to manage patient's drug therapy was obtained from the patient and/or an individual authorized to act on behalf of a patient. They were informed they may decline to participate or withdraw from participation in pharmacy services at any time.

## 2024-03-27 ENCOUNTER — SPECIALTY PHARMACY (OUTPATIENT)
Dept: PHARMACY | Facility: CLINIC | Age: 69
End: 2024-03-27

## 2024-03-27 RX ORDER — TIRZEPATIDE 2.5 MG/.5ML
2.5 INJECTION, SOLUTION SUBCUTANEOUS
Qty: 2 ML | Refills: 1 | Status: SHIPPED | OUTPATIENT
Start: 2024-03-27 | End: 2024-04-29 | Stop reason: SDUPTHER

## 2024-03-29 ENCOUNTER — HOSPITAL ENCOUNTER (OUTPATIENT)
Dept: RADIOLOGY | Facility: CLINIC | Age: 69
Discharge: HOME | End: 2024-03-29
Payer: MEDICARE

## 2024-03-29 DIAGNOSIS — K86.3 PSEUDOCYST OF PANCREAS (HHS-HCC): ICD-10-CM

## 2024-03-29 PROCEDURE — 76705 ECHO EXAM OF ABDOMEN: CPT

## 2024-03-29 PROCEDURE — 76705 ECHO EXAM OF ABDOMEN: CPT | Performed by: RADIOLOGY

## 2024-04-02 PROCEDURE — RXMED WILLOW AMBULATORY MEDICATION CHARGE

## 2024-04-03 ENCOUNTER — PHARMACY VISIT (OUTPATIENT)
Dept: PHARMACY | Facility: CLINIC | Age: 69
End: 2024-04-03
Payer: COMMERCIAL

## 2024-04-24 ENCOUNTER — HOSPITAL ENCOUNTER (OUTPATIENT)
Dept: CARDIOLOGY | Facility: CLINIC | Age: 69
Discharge: HOME | End: 2024-04-24
Payer: MEDICARE

## 2024-04-24 DIAGNOSIS — Z95.810 PRESENCE OF AUTOMATIC CARDIOVERTER/DEFIBRILLATOR (AICD): ICD-10-CM

## 2024-04-24 DIAGNOSIS — I49.01 VF (VENTRICULAR FIBRILLATION) (MULTI): ICD-10-CM

## 2024-04-24 PROCEDURE — 93284 PRGRMG EVAL IMPLANTABLE DFB: CPT | Performed by: INTERNAL MEDICINE

## 2024-04-24 PROCEDURE — 93284 PRGRMG EVAL IMPLANTABLE DFB: CPT

## 2024-04-24 PROCEDURE — RXMED WILLOW AMBULATORY MEDICATION CHARGE

## 2024-04-25 ENCOUNTER — PHARMACY VISIT (OUTPATIENT)
Dept: PHARMACY | Facility: CLINIC | Age: 69
End: 2024-04-25
Payer: COMMERCIAL

## 2024-04-29 ENCOUNTER — TELEPHONE (OUTPATIENT)
Dept: PRIMARY CARE | Facility: CLINIC | Age: 69
End: 2024-04-29
Payer: MEDICARE

## 2024-04-29 RX ORDER — TIRZEPATIDE 2.5 MG/.5ML
2.5 INJECTION, SOLUTION SUBCUTANEOUS
Qty: 2 ML | Refills: 2 | Status: SHIPPED | OUTPATIENT
Start: 2024-04-29

## 2024-05-01 ENCOUNTER — OFFICE VISIT (OUTPATIENT)
Dept: SLEEP MEDICINE | Facility: CLINIC | Age: 69
End: 2024-05-01
Payer: MEDICARE

## 2024-05-01 VITALS
DIASTOLIC BLOOD PRESSURE: 83 MMHG | OXYGEN SATURATION: 96 % | BODY MASS INDEX: 38.65 KG/M2 | SYSTOLIC BLOOD PRESSURE: 133 MMHG | HEART RATE: 96 BPM | HEIGHT: 68 IN | WEIGHT: 255 LBS

## 2024-05-01 DIAGNOSIS — E66.01 CLASS 2 SEVERE OBESITY WITH SERIOUS COMORBIDITY AND BODY MASS INDEX (BMI) OF 38.0 TO 38.9 IN ADULT, UNSPECIFIED OBESITY TYPE (MULTI): ICD-10-CM

## 2024-05-01 DIAGNOSIS — G47.33 OSA (OBSTRUCTIVE SLEEP APNEA): Primary | ICD-10-CM

## 2024-05-01 DIAGNOSIS — I10 PRIMARY HYPERTENSION: ICD-10-CM

## 2024-05-01 PROCEDURE — 3008F BODY MASS INDEX DOCD: CPT | Performed by: PHYSICIAN ASSISTANT

## 2024-05-01 PROCEDURE — 1126F AMNT PAIN NOTED NONE PRSNT: CPT | Performed by: PHYSICIAN ASSISTANT

## 2024-05-01 PROCEDURE — 3075F SYST BP GE 130 - 139MM HG: CPT | Performed by: PHYSICIAN ASSISTANT

## 2024-05-01 PROCEDURE — 3044F HG A1C LEVEL LT 7.0%: CPT | Performed by: PHYSICIAN ASSISTANT

## 2024-05-01 PROCEDURE — 1160F RVW MEDS BY RX/DR IN RCRD: CPT | Performed by: PHYSICIAN ASSISTANT

## 2024-05-01 PROCEDURE — 99203 OFFICE O/P NEW LOW 30 MIN: CPT | Performed by: PHYSICIAN ASSISTANT

## 2024-05-01 PROCEDURE — 1036F TOBACCO NON-USER: CPT | Performed by: PHYSICIAN ASSISTANT

## 2024-05-01 PROCEDURE — 99213 OFFICE O/P EST LOW 20 MIN: CPT | Performed by: PHYSICIAN ASSISTANT

## 2024-05-01 PROCEDURE — 3079F DIAST BP 80-89 MM HG: CPT | Performed by: PHYSICIAN ASSISTANT

## 2024-05-01 PROCEDURE — 3048F LDL-C <100 MG/DL: CPT | Performed by: PHYSICIAN ASSISTANT

## 2024-05-01 PROCEDURE — 1159F MED LIST DOCD IN RCRD: CPT | Performed by: PHYSICIAN ASSISTANT

## 2024-05-01 ASSESSMENT — SLEEP AND FATIGUE QUESTIONNAIRES
HOW LIKELY ARE YOU TO NOD OFF OR FALL ASLEEP WHILE SITTING AND TALKING TO SOMEONE: WOULD NEVER DOZE
HOW LIKELY ARE YOU TO NOD OFF OR FALL ASLEEP WHILE SITTING AND READING: SLIGHT CHANCE OF DOZING
HOW LIKELY ARE YOU TO NOD OFF OR FALL ASLEEP WHEN YOU ARE A PASSENGER IN A CAR FOR AN HOUR WITHOUT A BREAK: WOULD NEVER DOZE
HOW LIKELY ARE YOU TO NOD OFF OR FALL ASLEEP WHILE LYING DOWN TO REST IN THE AFTERNOON WHEN CIRCUMSTANCES PERMIT: WOULD NEVER DOZE
SITING INACTIVE IN A PUBLIC PLACE LIKE A CLASS ROOM OR A MOVIE THEATER: WOULD NEVER DOZE
HOW LIKELY ARE YOU TO NOD OFF OR FALL ASLEEP WHILE WATCHING TV: SLIGHT CHANCE OF DOZING
ESS-CHAD TOTAL SCORE: 2
HOW LIKELY ARE YOU TO NOD OFF OR FALL ASLEEP WHILE SITTING QUIETLY AFTER LUNCH WITHOUT ALCOHOL: WOULD NEVER DOZE
HOW LIKELY ARE YOU TO NOD OFF OR FALL ASLEEP IN A CAR, WHILE STOPPED FOR A FEW MINUTES IN TRAFFIC: WOULD NEVER DOZE

## 2024-05-01 ASSESSMENT — PATIENT HEALTH QUESTIONNAIRE - PHQ9
SUM OF ALL RESPONSES TO PHQ9 QUESTIONS 1 & 2: 0
1. LITTLE INTEREST OR PLEASURE IN DOING THINGS: NOT AT ALL
2. FEELING DOWN, DEPRESSED OR HOPELESS: NOT AT ALL

## 2024-05-01 ASSESSMENT — LIFESTYLE VARIABLES
HOW MANY STANDARD DRINKS CONTAINING ALCOHOL DO YOU HAVE ON A TYPICAL DAY: 1 OR 2
HOW OFTEN DO YOU HAVE SIX OR MORE DRINKS ON ONE OCCASION: NEVER
AUDIT-C TOTAL SCORE: 1
HOW OFTEN DO YOU HAVE A DRINK CONTAINING ALCOHOL: MONTHLY OR LESS
SKIP TO QUESTIONS 9-10: 1

## 2024-05-01 ASSESSMENT — PAIN SCALES - GENERAL: PAINLEVEL: 0-NO PAIN

## 2024-05-01 NOTE — PATIENT INSTRUCTIONS
Thank you for coming to the Sleep Medicine Clinic today! Your sleep medicine provider today was: Geoff Brown PA-C Below is a summary of your treatment plan, other important information, and our contact numbers:      TREATMENT PLAN     Call [phone]), option 3 to schedule your sleep study. When you have an appointment please call us back at [phone] to schedule a followup appointment 3-4 weeks after to review results.    Obstructive Sleep Apnea (HAYDEN) is a sleep disorder where your upper airway muscles relax during sleep and the airway intermittently and repetitively narrows and collapses leading to partially blocked airway (hypopnea) or completely blocked airway (apnea) which, in turn, can disrupt breathing in sleep, lower oxygen levels while you sleep and cause night time wakings. Because both apnea and hypopnea may cause higher carbon dioxide or low oxygen levels, untreated HAYDEN can lead to heart arrhythmia, elevation of blood pressure, and make it harder for the body to consolidate memory and facilitate metabolism (leading to higher blood sugars at night). Frequent partial arousals occur during sleep resulting in sleep deprivation and daytime sleepiness. HAYDEN is associated with an increased risk of cardiovascular disease, stroke, hypertension, and insulin resistance. Moreover, untreated HAYDEN with excessive daytime sleepiness can increase the risk of motor vehicular accidents.    Some conservative strategies for HAYDEN regardless of HAYDEN severity are:   Positional therapy - Avoid sleeping on your back.   Healthy diet and regular exercise to optimize weight is highly encouraged.   Avoid alcohol late in the evening and sedative-hypnotics as these substances can make sleep apnea worse.   Improve breathing through the nose with intranasal steroid spray, saline rinse, or antihistamines    Safety: Avoid driving vehicle and operating heavy equipment while sleepy. Drowsy driving may lead to life-threatening  motor vehicle accidents. A person driving while sleepy is 5 times more likely to have an accident. If you feel sleepy, pull over and take a short power nap (sleep for less than 30 minutes). Otherwise, ask somebody to drive you.    Treatment options for sleep apnea include weight management, positional therapy, Positive Airway Therapy (PAP) therapy, oral appliance therapy, hypoglossal nerve stimulator (Inspire) and select airway surgeries.      OUR SLEEP TESTING LOCATIONS     Our team will contact you to schedule your sleep study, however, you can contact us as follow:  Main Phone Line (scheduling only): [phone]), option 3  Adult and Pediatric Locations  [facility] (6 years and older): [facility] - 4th floor ([address] [facility]) After hours line: [phone]  [facility] (Main campus: All ages): [facility], 6th floor. After hours line: [phone]   [place] (18 years and older): [address] [facility], 2nd floor   [place] (18 years and older): [address] [facility]; 4th floor  After hours line: [phone]  [facility] (18 years and older) at [place]: [address] [facility] [place]  After hours line: [phone]    [place] (5 years and older; younger considered on case-by-case basis): [address] [facility]; [address], [address]. Scheduling  After hours line: [phone]   [place] (6 years and older): [address] ; [address]; [address]   [place] (6 years and older): [address] [facility], [address]  After hours line: [phone]   [place] (13 years and older) in [place]: [address], 2nd floor  After hours line: [phone]   [place] (13 year and older): [address]  After hours line: [phone]      IMPORTANT PHONE NUMBERS     Sleep Medicine Clinic Fax: [phone]  Appointments (for Adult Sleep Clinic): [phone] - option 2  Appointments (For Sleep Studies):  [phone] - option 3  Behavioral Sleep Medicine: [phone]    Simris Alg ([employer]): [phone]  For clinical questions and refilling prescriptions: [phone]  Dana Bales (For [place]/Stephanie): P: [phone]  F: [phone]       CONTACTING YOUR SLEEP MEDICINE PROVIDER     Send a message directly to your provider through \"My Chart\", which is the email service through your  Records Account: [email or web address]   Call [phone] and leave a message. One of the administrative assistants will forward the message to your sleep medicine provider through \"My Chart\" and/or email.     Your sleep medicine provider for this visit was: Geoff Brown PA-C    "

## 2024-05-21 ENCOUNTER — APPOINTMENT (OUTPATIENT)
Dept: PREADMISSION TESTING | Facility: HOSPITAL | Age: 69
End: 2024-05-21
Payer: MEDICARE

## 2024-05-22 PROCEDURE — RXMED WILLOW AMBULATORY MEDICATION CHARGE

## 2024-05-23 ENCOUNTER — PHARMACY VISIT (OUTPATIENT)
Dept: PHARMACY | Facility: CLINIC | Age: 69
End: 2024-05-23
Payer: COMMERCIAL

## 2024-05-30 ENCOUNTER — APPOINTMENT (OUTPATIENT)
Dept: GASTROENTEROLOGY | Facility: HOSPITAL | Age: 69
End: 2024-05-30
Payer: MEDICARE

## 2024-06-08 DIAGNOSIS — I50.32 CHRONIC DIASTOLIC HEART FAILURE (MULTI): ICD-10-CM

## 2024-06-10 RX ORDER — FUROSEMIDE 20 MG/1
20 TABLET ORAL EVERY 12 HOURS
Qty: 180 TABLET | Refills: 3 | Status: SHIPPED | OUTPATIENT
Start: 2024-06-10

## 2024-06-11 DIAGNOSIS — I50.32 CHRONIC DIASTOLIC HEART FAILURE (MULTI): ICD-10-CM

## 2024-06-11 DIAGNOSIS — I25.10 CORONARY ARTERY DISEASE INVOLVING NATIVE CORONARY ARTERY OF NATIVE HEART WITHOUT ANGINA PECTORIS: ICD-10-CM

## 2024-06-11 DIAGNOSIS — I10 PRIMARY HYPERTENSION: ICD-10-CM

## 2024-06-11 RX ORDER — CARVEDILOL 25 MG/1
25 TABLET ORAL 2 TIMES DAILY
Qty: 180 TABLET | Refills: 3 | Status: SHIPPED | OUTPATIENT
Start: 2024-06-11 | End: 2025-06-11

## 2024-06-19 PROCEDURE — RXMED WILLOW AMBULATORY MEDICATION CHARGE

## 2024-06-20 ENCOUNTER — PHARMACY VISIT (OUTPATIENT)
Dept: PHARMACY | Facility: CLINIC | Age: 69
End: 2024-06-20
Payer: COMMERCIAL

## 2024-06-20 ENCOUNTER — LAB (OUTPATIENT)
Dept: LAB | Facility: LAB | Age: 69
End: 2024-06-20
Payer: MEDICARE

## 2024-06-20 ENCOUNTER — PRE-ADMISSION TESTING (OUTPATIENT)
Dept: PREADMISSION TESTING | Facility: HOSPITAL | Age: 69
End: 2024-06-20
Payer: MEDICARE

## 2024-06-20 VITALS
BODY MASS INDEX: 37.3 KG/M2 | RESPIRATION RATE: 16 BRPM | HEIGHT: 68 IN | OXYGEN SATURATION: 99 % | TEMPERATURE: 96.8 F | SYSTOLIC BLOOD PRESSURE: 112 MMHG | HEART RATE: 62 BPM | DIASTOLIC BLOOD PRESSURE: 67 MMHG | WEIGHT: 246.1 LBS

## 2024-06-20 DIAGNOSIS — Z01.818 PREOP TESTING: Primary | ICD-10-CM

## 2024-06-20 DIAGNOSIS — Z01.818 PREOP TESTING: ICD-10-CM

## 2024-06-20 LAB
ANION GAP SERPL CALC-SCNC: 12 MMOL/L
BASOPHILS # BLD AUTO: 0.04 X10*3/UL (ref 0–0.1)
BASOPHILS NFR BLD AUTO: 0.4 %
BUN SERPL-MCNC: 49 MG/DL (ref 8–25)
CALCIUM SERPL-MCNC: 10 MG/DL (ref 8.5–10.4)
CHLORIDE SERPL-SCNC: 97 MMOL/L (ref 97–107)
CO2 SERPL-SCNC: 30 MMOL/L (ref 24–31)
CREAT SERPL-MCNC: 2.1 MG/DL (ref 0.4–1.6)
EGFRCR SERPLBLD CKD-EPI 2021: 34 ML/MIN/1.73M*2
EOSINOPHIL # BLD AUTO: 0.41 X10*3/UL (ref 0–0.7)
EOSINOPHIL NFR BLD AUTO: 4.5 %
ERYTHROCYTE [DISTWIDTH] IN BLOOD BY AUTOMATED COUNT: 13.6 % (ref 11.5–14.5)
GLUCOSE SERPL-MCNC: 115 MG/DL (ref 65–99)
HCT VFR BLD AUTO: 40.5 % (ref 41–52)
HGB BLD-MCNC: 13.7 G/DL (ref 13.5–17.5)
IMM GRANULOCYTES # BLD AUTO: 0.03 X10*3/UL (ref 0–0.7)
IMM GRANULOCYTES NFR BLD AUTO: 0.3 % (ref 0–0.9)
LYMPHOCYTES # BLD AUTO: 2.74 X10*3/UL (ref 1.2–4.8)
LYMPHOCYTES NFR BLD AUTO: 30.2 %
MCH RBC QN AUTO: 28.8 PG (ref 26–34)
MCHC RBC AUTO-ENTMCNC: 33.8 G/DL (ref 32–36)
MCV RBC AUTO: 85 FL (ref 80–100)
MONOCYTES # BLD AUTO: 0.78 X10*3/UL (ref 0.1–1)
MONOCYTES NFR BLD AUTO: 8.6 %
NEUTROPHILS # BLD AUTO: 5.08 X10*3/UL (ref 1.2–7.7)
NEUTROPHILS NFR BLD AUTO: 56 %
NRBC BLD-RTO: 0 /100 WBCS (ref 0–0)
PLATELET # BLD AUTO: 136 X10*3/UL (ref 150–450)
POTASSIUM SERPL-SCNC: 4.2 MMOL/L (ref 3.4–5.1)
RBC # BLD AUTO: 4.76 X10*6/UL (ref 4.5–5.9)
SODIUM SERPL-SCNC: 139 MMOL/L (ref 133–145)
WBC # BLD AUTO: 9.1 X10*3/UL (ref 4.4–11.3)

## 2024-06-20 PROCEDURE — 80048 BASIC METABOLIC PNL TOTAL CA: CPT

## 2024-06-20 PROCEDURE — 85025 COMPLETE CBC W/AUTO DIFF WBC: CPT

## 2024-06-20 PROCEDURE — 36415 COLL VENOUS BLD VENIPUNCTURE: CPT

## 2024-06-20 ASSESSMENT — DUKE ACTIVITY SCORE INDEX (DASI)
CAN YOU WALK A BLOCK OR TWO ON LEVEL GROUND: YES
DASI METS SCORE: 6.6
CAN YOU DO HEAVY WORK AROUND THE HOUSE LIKE SCRUBBING FLOORS OR LIFTING AND MOVING HEAVY FURNITURE: YES
CAN YOU RUN A SHORT DISTANCE: NO
CAN YOU WALK INDOORS, SUCH AS AROUND YOUR HOUSE: YES
CAN YOU TAKE CARE OF YOURSELF (EAT, DRESS, BATHE, OR USE TOILET): YES
CAN YOU DO LIGHT WORK AROUND THE HOUSE LIKE DUSTING OR WASHING DISHES: YES
CAN YOU DO MODERATE WORK AROUND THE HOUSE LIKE VACUUMING, SWEEPING FLOORS OR CARRYING GROCERIES: YES
CAN YOU DO YARD WORK LIKE RAKING LEAVES, WEEDING OR PUSHING A MOWER: YES
TOTAL_SCORE: 31.45
CAN YOU PARTICIPATE IN STRENOUS SPORTS LIKE SWIMMING, SINGLES TENNIS, FOOTBALL, BASKETBALL, OR SKIING: NO
CAN YOU HAVE SEXUAL RELATIONS: NO
CAN YOU CLIMB A FLIGHT OF STAIRS OR WALK UP A HILL: YES
CAN YOU PARTICIPATE IN MODERATE RECREATIONAL ACTIVITIES LIKE GOLF, BOWLING, DANCING, DOUBLES TENNIS OR THROWING A BASEBALL OR FOOTBALL: NO

## 2024-06-20 ASSESSMENT — ENCOUNTER SYMPTOMS
ENDOCRINE NEGATIVE: 1
RESPIRATORY NEGATIVE: 1
EYES NEGATIVE: 1
NEUROLOGICAL NEGATIVE: 1
CARDIOVASCULAR NEGATIVE: 1
NECK NEGATIVE: 1
CONSTITUTIONAL NEGATIVE: 1
GASTROINTESTINAL NEGATIVE: 1
MUSCULOSKELETAL NEGATIVE: 1

## 2024-06-20 ASSESSMENT — PAIN - FUNCTIONAL ASSESSMENT: PAIN_FUNCTIONAL_ASSESSMENT: 0-10

## 2024-06-20 ASSESSMENT — PAIN SCALES - GENERAL: PAINLEVEL_OUTOF10: 0 - NO PAIN

## 2024-06-20 NOTE — CPM/PAT H&P
CPM/PAT Evaluation       Name: Homero Brasher (Homero Brasher)  /Age: 1955/68 y.o.     Visit Type:   In-Person       Chief Complaint: Routine colonoscopy    HPI this is a 68-year-old male referred to preadmission testing for evaluation in advance of his colonoscopy scheduled for 2024.     Past Medical History:   Diagnosis Date    Presence of cardiac pacemaker 2016    History of cardiac pacemaker       Past Surgical History:   Procedure Laterality Date    CHOLECYSTECTOMY  10/20/2014    Cholecystectomy    CORONARY ARTERY BYPASS GRAFT  10/20/2014    CABG    OTHER SURGICAL HISTORY  2022    Permanent pacemaker insertion       Patient Sexual activity questions deferred to the physician.    Family History   Problem Relation Name Age of Onset    Other (CVA) Father      Stroke Father      Esophageal cancer Brother         Allergies   Allergen Reactions    Aspirin Nausea/vomiting       Prior to Admission medications    Medication Sig Start Date End Date Taking? Authorizing Provider   amLODIPine (Norvasc) 5 mg tablet Take 1 tablet (5 mg) by mouth once daily. 3/20/24 3/20/25  Satish Sandoval MD   aspirin 81 mg EC tablet Take 1 tablet (81 mg) by mouth once daily. 3/20/24   Satish Sandoval MD   atorvastatin (Lipitor) 80 mg tablet Take 1 tablet (80 mg) by mouth once daily. 3/20/24 3/20/25  Satish Sandoval MD   carvedilol (Coreg) 25 mg tablet Take 1 tablet (25 mg) by mouth 2 times a day. 24  Satish Sandoval MD   doxazosin (Cardura) 8 mg tablet Take 1 tablet (8 mg) by mouth once daily at bedtime. 3/20/24   Satish Sandoval MD   famotidine (Pepcid) 20 mg tablet Take 1 tablet (20 mg) by mouth once daily. 3/20/24 3/20/25  Satish Sandoval MD   flaxseed oiL 1,000 mg capsule TAKE AS DIRECTED. 10/20/14   Historical Provider, MD   furosemide (Lasix) 20 mg tablet TAKE 1 TABLET BY MOUTH EVERY 12 HOURS 6/10/24   Satish Sandoval MD   losartan-hydrochlorothiazide (Hyzaar) 100-25 mg tablet  [] [place]       Occupational Therapy            1st floor       [address], [place]         Phone: [phone]       Fax: [phone] [x] [facility]  [address] [place] [address]  Phone: [phone]  Fax: [phone]     Occupational Therapy Daily Treatment Note    Date:  3/8/2023  Patient Name:  Brett Ritchie    :  1988  MRN: [id number]  Referring Provider:  ALEX Oliveira CNP  Insurance: Other [facility] for 15 visits  Medical Diagnosis: Right carpal tunnel syndrome (G56.01), Cubital canal compression syndrome, right (G56.21), Post-op pain (G89.18)  Rehab Codes: spasms of muscle other M62.838, , pain in right upper arm M79.621,, pain in right forearm M79.631,, or pain in right hand M79.641,  Onset Date: 22               Next Dr. Calderón Ni: 23  Visit# / total visits: 13/15; Progress note for Medicare patient due at visit 8-9    Cancels/No Shows: 0/1      Subjective:    Pain:  [] Yes  [x] No Location: L elbow Pain Rating: (0-10 scale) 0/10  Pain altered Tx:  [x] No  [] Yes  Action:  Pt Comments: pt reports she is doing better. She states she is still having muscular soreness in the R elbow during activities. Pt cont to report \"burning\" sensation in R elbow.      Objective:  Modalities:  Precautions:  Exercises:  Exercise Reps/Time Weight/Level Comments Completed    Elbow AROM  3x10 AROM HEP  Flexion/extension -   Pronation/supination 3x10 2lbs  -   Wall wash 3x10 AAROM Flexion  Scaption  -   Wrist AROM 3x10 AROM HEP  Flexion/extension -   Tendon gliding 3x10 AROM  -   Gripping 2-3 mins Tan    -   Pullys  2 mins each AAROM  Flexion  Scaption  -   Shoulder extension 3x10 Green  -   Tricep extension  3x10 Green  X   Rows  3x10 Green  X   Serratus anterior slide 3x10 AROM  -   Flexbar  Wrist Flexion/Ext  Wrist pronation  Wrist supination  Wrist ulnar deviation  Wrist radial dev 3x10 Red  X   UBE 2x3 mins AROM Forward/backward  X   Bicep curl  1x20 Red  -   IR/ER  3x10 Green  -   Rotating curls 3x10 4lbs  X   Power web 3x10 yellow  X   Metal gripper 3x10 35lbs  X   Ulnar nerve glide  2x10 AROM  -   Other:    Treatment Charges: Mins Units   []  Modalities:      []  Ultrasound     [x]  Ther Exercise 35 2   [x]  Manual Therapy 10 1   []  Ther Activities     []  Orthotic fit/train     []  Orthotic recheck     []  Other     Total Treatment time 45 mins        Assessment: [x] Progressing toward goals. Pt completed UBE for muscular conditioning. Completed soft tissue mobilization to the R bicep, brachialis, and forearm. Pt complete  and hand strengthening. Pt tolerated tx well w/out complaint. Pt tolerated tx well on this date. [] No change. [] Other     [x] Patient would continue to benefit from skilled occupational therapy services in order to: Improve  functional /grasp, I with ADLS, ROM, Strength, Activity tolerance, and Complaint of pain in order to Ensure safe return to work, improve functional use of UE in ADL performance, decrease pain in UE for safe completion of ADLs, and return to PLOF      STG/LTG  Short Term Goals: (  8    Treatments)  Decrease Pain by 2 points on numeric pain scale Met  Increase AROM of R wrist extension by 15 degrees Met  Increase  strength by 10 lbs (set 2/8/23)  Increase pinch strength by 5lbs (set 2/8/23)  Increase function:UE Functional Index Score 20 or more points to promote increased functional abilities Met   Scar will be soft and pliable with minimal tethering. Met  Decrease Edema by .5cm  in R elbow and wrist Not Met  Pt will report ability to complete bathing w/ min assist  Met  Pt will report ability to celine/doff clothing w/ min assist   Met  Patient to be independent with home exercise program as demonstrated by performance with correct form without cues.  Met     Long Term Goals: (  15  Treatments)  Decrease Pain by 2 points on numeric pain scale   Increase AROM of R Take 1 tablet by mouth once daily. 3/20/24 3/20/25  Satish Sandoval MD   metFORMIN  mg 24 hr tablet Take 1 tablet (500 mg) by mouth once daily in the evening. Take with meals. 3/20/24 3/20/25  Satish Sandoval MD   multivit-min/ferrous fumarate (MULTI VITAMIN ORAL) as directed Orally    Historical Provider, MD   nitroglycerin (Nitrostat) 0.4 mg SL tablet Place 1 tablet (0.4 mg) under the tongue every 5 minutes if needed for chest pain. 3/20/24   Satish Sandoval MD   potassium gluconate 595 mg (99 mg) tablet Take 2 tablets by mouth once daily. 3/20/24   Satish Sandoval MD   tirzepatide (Mounjaro) 2.5 mg/0.5 mL pen injector Inject 2.5 mg under the skin 1 (one) time per week.  Patient taking differently: Inject 2.5 mg under the skin 1 (one) time per week. WEDNESDAY 4/29/24   Satish Sandoval MD        PAT ROS:   Constitutional:   neg    Neuro/Psych:   neg    Eyes:   neg    Ears:   neg    Nose:   neg    Mouth:   neg    Throat:   neg    Neck:   neg    Cardio:   neg    Respiratory:   neg    Endocrine:   neg    GI:   neg    :   neg    Musculoskeletal:   neg    Hematologic:   neg    Skin:  neg        Physical Exam  Vitals and nursing note reviewed. Physical exam within normal limits.   Musculoskeletal:      Right lower leg: Edema present.      Left lower leg: Edema present.          PAT AIRWAY:   Airway:     Mallampati::  III    TM distance::  >3 FB    Neck ROM::  Full      Visit Vitals  /67   Pulse 62   Temp 36 °C (96.8 °F) (Temporal)   Resp 16       DASI Risk Score      Flowsheet Row Most Recent Value   DASI SCORE 31.45   METS Score (Will be calculated only when all the questions are answered) 6.6          Caprini DVT Assessment    No data to display       Modified Frailty Index    No data to display       CHADS2 Stroke Risk  Current as of 18 minutes ago        N/A 3 to 100%: High Risk   2 to < 3%: Medium Risk   0 to < 2%: Low Risk     Last Change: N/A          This score determines the patient's  risk of having a stroke if the patient has atrial fibrillation.        This score is not applicable to this patient. Components are not calculated.          Revised Cardiac Risk Index    No data to display       Apfel Simplified Score    No data to display       Risk Analysis Index Results This Encounter    No data found in the last 1 encounters.       Stop Bang Score      Flowsheet Row Most Recent Value   Do you snore loudly? 0   Do you often feel tired or fatigued after your sleep? 0   Has anyone ever observed you stop breathing in your sleep? 1   Do you have or are you being treated for high blood pressure? 1   Recent BMI (Calculated) 37.4   Is BMI greater than 35 kg/m2? 1=Yes   Age older than 50 years old? 1=Yes   Is your neck circumference greater than 17 inches (Male) or 16 inches (Female)? 1   Gender - Male 1=Yes   STOP-BANG Total Score 6          Assessment and Plan   68-year-old male presenting to preadmission testing for evaluation prior to his colonoscopy.  This is routine and he is not having any GI bleeding or any problems at this time    He has a past medical history of CAD, status post CABG, cardiomyopathy, ICD in place, heart failure, hyperlipidemia, hypertension, history of myocardial infarction, history of cardiac arrest, S/p coronary artery stent placement, thrombocytopenia, type 2 diabetes, GERD, pseudocyst of the pancreas, BPH, depression, HAYDEN,    Neuro:  Depression    HEENT/Airway:  No diagnosis or significant findings on chart review or clinical presentation and evaluation.     Cardiovascular:  Denies dizziness, chest pain, pressure, palpitations, SOB, lightheadedness,   Vital signs are stable, he does have lower extremity edema bilaterally  Hypertension-continue amlodipine 5 mg as prescribed Coreg 25 mg as prescribed Hyzaar 100-25 mg tablet as prescribed  Hyperlipidemia-continue atorvastatin 80 mg as prescribed  CAD-nitroglycerin as needed, baby aspirin 81 mg daily,  Pacemaker /ICD check  wrist extension by 10 degrees  Increase  strength by 10 lbs (set 2/8/23)  Increase pinch strength by 5lbs (set 2/8/23)  Increase function:UE Functional Index Score 20 or more points to promote increased functional abilities  Scar will be soft and pliable with minimal tethering. Decrease Edema by .5cm  in R elbow and wrist  Pt will report ability to complete bathing independently  Pt will report ability to celine/doff clothing independently      Pt. Education:  [] Yes  [x] No  [] Reviewed Prior HEP/Ed  Method of Education: [] Verbal  [] Demo  [] Written  Re:  Comprehension of Education:  [] Verbalizes understanding. [] Demonstrates understanding. [] Needs review. [] Demonstrates/verbalizes HEP/Ed previously given. Plan: [x] Continue current frequency toward short and long term goals.   [x] Specific Instructions for subsequent treatments: cont to progress strengthening    [] Other:       Time In: 7670  Time Out: 5015      Electronically signed by:  BIPIN [place]/IZZY 4/24/2024 see EMR  Cardiomyopathy-continue all cardiac meds as prescribed and Lasix 1 g twice a day as well as potassium as prescribed    DORINDA: 0.9% risk for postoperative MACE  EKG -2/20/2024 Normal sinus rhythm  Right ventricular hypertrophy  Inferior infarct , age undetermined  Anterolateral infarct , age undetermined  Abnormal ECG  When compared with ECG of 01-SEP-2023 08:22,  Sinus rhythm has replaced Electronic ventricular pacemaker    Echo-3/16/2023 CONCLUSIONS:  1. Left ventricular systolic function is normal with a 55-60% estimated ejection fraction.  2. Abnormal septal motion consistent with RV pacemaker.  3. Mild to moderate tricuspid regurgitation visualized.  4. There is mild mitral regurgitation.  5. Moderately elevated pulmonary artery pressure, estimated RVSP 52mmHg.    Pulmonary:  He is followed by sleep medicine and he will be having evaluation for of HAYDEN on June 23 2024    ARISCAT: <26 points, 1.6% risk of in-hospital postoperative pulmonary complication  PRODIGY: High risk for opioid induced respiratory depression  Discussed smoking cessation and deep breathing handout given    Renal:   No diagnosis or significant findings on chart review, or clinical presentation and evaluation.     Pt at Moderate risk for perioperative CALLUM based on Dynamic Predictive Scoring Tool for Perioperative CALLUM  Lab Results   Component Value Date    GLUCOSE 134 (H) 03/18/2024    CALCIUM 9.7 03/18/2024     03/18/2024    K 3.6 03/18/2024    CO2 30 03/18/2024     03/18/2024    BUN 19 03/18/2024    CREATININE 1.00 03/18/2024       Endocrine:  Diabetes patient A1c stable, continue metformin, and Mounjaro as prescribed perioperative instructions have given related to medications-    Lab Results   Component Value Date    HGBA1C 6.5 (H) 03/18/2024       Hematologic:  No diagnosis or significant findings on chart review or clinical presentation and evaluation.  Lab Results   Component Value Date    WBC 7.4 03/18/2024     HGB 14.3 2024    HCT 42.5 2024    MCV 86 2024     (L) 2024     Pt supplied education/VTE handout    Gastrointestinal:   GERD-continue famotidine 20 mg as prescribed    :  BPH-continue Cardura as prescribed    Infectious disease:   No diagnosis or significant findings on chart review or clinical presentation and evaluation.     Musculoskeletal:   No diagnosis or significant findings on chart review or clinical presentation and evaluation.     Preoperative risk assessment  ASA III  STORMY - 6 points Risk for HAYDEN   NSQIP - Predicted length of stay days 0  PAT Testing -BMP CBC    *CLEARED FOR SURGERY - see secure messaging below  PENDING LABS/EKG-LABS REVIEWED, STABLE. OK TO PROCEED    Anesthesia:  No anesthesia complications    denies dental issues  Smoker-never  Drugs-denies drug abuse  ETOH-denies alcohol consumption  Denies personal/family issues with Anesthesia    Secure messagin24  DR MARIN CORDOVA HAD THE OPPORTUNITY TO EVALUATE MR. KENDRICK TODAY IN pat IN ADVANCE OF HIS COLONOSCOPY SCHEDULED 24 WOULD YOU PLEASE STATE/DOCUMENT HIS RISK FOR UNDERGOING PROCEDURE? 112/67 DENIES CHEST PAIN OR SOB, DENIES ANY icd FIRING. OR DO YOU NEED TO SEE HIM?     Patient is at acceptable risk to proceed with planned surgical procedure. Further cardiac risk stratification deferred at this time.This patient is low risk candidate undergoing moderate risk procedure, patient is medically optimized for surgery    Discussed with patient medication instructions, NPO guidelines, and any questions or concerns. Patient does not need further workup prior to preceding with elective surgery based on based on risk assessment. .     CHLORHEXIDINE .12% DENTAL RINSE E PRESCIRBED PER  INFECTION PREVENTION PROTOCOL. PATIENT EDUCATED   Patient advised to call [facility] if does not receive Mouthwash    Face to Face patient contact time 45 minutes    LATOYA Ling 2024 4:29  PM      Addendum:   Dr. Masters cardiac clearance  in [facility].

## 2024-06-20 NOTE — PREPROCEDURE INSTRUCTIONS
Medication List            Accurate as of June 20, 2024  4:03 PM. Always use your most recent med list.                amLODIPine 5 mg tablet  Commonly known as: Norvasc  Take 1 tablet (5 mg) by mouth once daily.  Medication Adjustments for Surgery: Take morning of surgery with sip of water, no other fluids     aspirin 81 mg EC tablet  Take 1 tablet (81 mg) by mouth once daily.  Notes to patient: CALL GASTROENTEROLOGY  FOR ASPIRIN INSTRUCTIONS     atorvastatin 80 mg tablet  Commonly known as: Lipitor  Take 1 tablet (80 mg) by mouth once daily.  Medication Adjustments for Surgery: Continue until night before surgery     carvedilol 25 mg tablet  Commonly known as: Coreg  Take 1 tablet (25 mg) by mouth 2 times a day.  Medication Adjustments for Surgery: Take morning of surgery with sip of water, no other fluids     doxazosin 8 mg tablet  Commonly known as: Cardura  Take 1 tablet (8 mg) by mouth once daily at bedtime.  Medication Adjustments for Surgery: Continue until night before surgery     famotidine 20 mg tablet  Commonly known as: Pepcid  Take 1 tablet (20 mg) by mouth once daily.  Medication Adjustments for Surgery: Take morning of surgery with sip of water, no other fluids     flaxseed oiL 1,000 mg capsule  Medication Adjustments for Surgery: Stop 7 days before surgery     furosemide 20 mg tablet  Commonly known as: Lasix  TAKE 1 TABLET BY MOUTH EVERY 12 HOURS  Medication Adjustments for Surgery: Continue until night before surgery     losartan-hydrochlorothiazide 100-25 mg tablet  Commonly known as: Hyzaar  Take 1 tablet by mouth once daily.  Medication Adjustments for Surgery: Stop 1 day before surgery  Notes to patient: STOP MEDICATION 24 BEFORE ANESTHESIA     metFORMIN  mg 24 hr tablet  Commonly known as: Glucophage-XR  Take 1 tablet (500 mg) by mouth once daily in the evening. Take with meals.  Medication Adjustments for Surgery: Continue until night before surgery     Mounjaro 2.5  mg/0.5 mL pen injector  Generic drug: tirzepatide  Inject 2.5 mg under the skin 1 (one) time per week.  Medication Adjustments for Surgery: Stop 7 days before surgery     MULTI VITAMIN ORAL  Medication Adjustments for Surgery: Stop 7 days before surgery     nitroglycerin 0.4 mg SL tablet  Commonly known as: Nitrostat  Place 1 tablet (0.4 mg) under the tongue every 5 minutes if needed for chest pain.     potassium gluconate 595 mg (99 mg) tablet  Take 2 tablets by mouth once daily.  Medication Adjustments for Surgery: Continue until night before surgery                 FOLLOW PRINTED INSTRUCTIONS PROVIDED TO YOU BY DR. VALLADARES [facility]               NPO Instructions:    Do not eat any food after midnight the night before your surgery/procedure.    Additional Instructions:     Day of Surgery:  Review your medication instructions, take indicated medications  Wear  comfortable loose fitting clothing  Do not use moisturizers, creams, lotions or perfume  All jewelry and valuables should be left at home  PAT DISCHARGE INSTRUCTIONS    Please call the Same Day Surgery (SDS) Department of the hospital where your procedure will be performed after 2:00 PM the day before your surgery. If you are scheduled on a Monday, or a Tuesday following a Monday holiday, you will need to call on the last business day prior to your surgery.    [address], [address] [phone]  Second Floor      Please let your surgeon know if:      You develop any open sores, shingles, burning or painful urination as these may increase your risk of an infection.   You no longer wish to have the surgery.   Any other personal circumstances change that may lead to the need to cancel or defer this surgery-such as being sick or getting admitted to any hospital within one week of your planned procedure.    Your contact details change, such as a change of  address or phone number.    Starting now:     Please DO NOT drink alcohol or smoke for 24 hours before surgery. It is well known that quitting smoking can make a huge difference to your health and recovery from surgery. The longer you abstain from smoking, the better your chances of a healthy recovery. If you need help with quitting, call 1-800-QUIT-NOW to be connected to a trained counselor who will discuss the best methods to help you quit.     Before your surgery:    Please stop all supplements 7 days prior to surgery. Or as directed by your surgeon.   Please stop taking NSAID pain medicine such as Advil and Motrin 7 days before surgery.    If you develop any fever, cough, cold, rashes, cuts, scratches, scrapes, urinary symptoms or infection anywhere on your body (including teeth and gums) prior to surgery, please call your surgeon’s office as soon as possible. This may require treatment to reduce the chance of cancellation on the day of surgery.    The day before your surgery:   DIET- Please follow the diet instructions at the top of your packet.   Get a good night’s rest.  Use the special soap for bathing if you have been instructed to use one.    Scheduled surgery times may change and you will be notified if this occurs - please check your personal voicemail for any updates.     On the morning of surgery:   Wear comfortable, loose fitting clothes which open in the front. Please do not wear moisturizers, creams, lotions, makeup or perfume.    Please bring with you to surgery:   Photo ID and insurance card   Current list of medicines and allergies   Pacemaker/ Defibrillator/Heart stent cards   CPAP machine and mask    Slings/ splints/ crutches   A copy of your complete advanced directive/DHPOA.    Please do NOT bring with you to surgery:   All jewelry and valuables should be left at home.   Prosthetic devices such as contact lenses, hearing aids, dentures, eyelash extensions, hairpins and body piercings must be  removed prior to going in to the surgical suite.    After outpatient surgery:   A responsible adult MUST accompany you at the time of discharge and stay with you for 24 hours after your surgery. You may NOT drive yourself home after surgery.    Do not drive, operate machinery, make critical decisions or do activities that require co-ordination or balance until after a night’s sleep.   Do not drink alcoholic beverages for 24 hours.   Instructions for resuming your medications will be provided by your surgeon.    CALL YOUR DOCTOR AFTER SURGERY IF YOU HAVE:     Chills and/or a fever of 101° F or higher.    Redness, swelling, pus or drainage from your surgical wound or a bad smell from the wound.    Lightheadedness, fainting or confusion.    Persistent vomiting (throwing up) and are not able to eat or drink for 12 hours.    Three or more loose, watery bowel movements in 24 hours (diarrhea).   Difficulty or pain while urinating( after non-urological surgery)    Pain and swelling in your legs, especially if it is only on one side.    Difficulty breathing or are breathing faster than normal.    Any new concerning symptoms.

## 2024-06-22 ENCOUNTER — LAB (OUTPATIENT)
Dept: LAB | Facility: LAB | Age: 69
End: 2024-06-22
Payer: MEDICARE

## 2024-06-22 DIAGNOSIS — Z01.818 PREOP TESTING: ICD-10-CM

## 2024-06-22 LAB
ANION GAP SERPL CALC-SCNC: 15 MMOL/L (ref 10–20)
BUN SERPL-MCNC: 30 MG/DL (ref 6–23)
CALCIUM SERPL-MCNC: 9.3 MG/DL (ref 8.6–10.3)
CHLORIDE SERPL-SCNC: 100 MMOL/L (ref 98–107)
CO2 SERPL-SCNC: 29 MMOL/L (ref 21–32)
CREAT SERPL-MCNC: 1.19 MG/DL (ref 0.5–1.3)
EGFRCR SERPLBLD CKD-EPI 2021: 67 ML/MIN/1.73M*2
GLUCOSE SERPL-MCNC: 92 MG/DL (ref 74–99)
POTASSIUM SERPL-SCNC: 3 MMOL/L (ref 3.5–5.3)
SODIUM SERPL-SCNC: 141 MMOL/L (ref 136–145)

## 2024-06-22 PROCEDURE — 80048 BASIC METABOLIC PNL TOTAL CA: CPT

## 2024-06-22 PROCEDURE — 36415 COLL VENOUS BLD VENIPUNCTURE: CPT

## 2024-06-23 ENCOUNTER — CLINICAL SUPPORT (OUTPATIENT)
Dept: SLEEP MEDICINE | Facility: HOSPITAL | Age: 69
End: 2024-06-23
Payer: MEDICARE

## 2024-06-23 DIAGNOSIS — G47.33 OSA (OBSTRUCTIVE SLEEP APNEA): ICD-10-CM

## 2024-06-24 VITALS
DIASTOLIC BLOOD PRESSURE: 89 MMHG | WEIGHT: 246.91 LBS | BODY MASS INDEX: 37.42 KG/M2 | HEIGHT: 68 IN | SYSTOLIC BLOOD PRESSURE: 130 MMHG

## 2024-06-24 ASSESSMENT — PAIN SCALES - GENERAL: PAINLEVEL: 0-NO PAIN

## 2024-06-24 NOTE — PROGRESS NOTES
[facility] TECH NOTE:     Patient: Homero Brasher   MRN//AGE: [id number]  1955  68 y.o.   Technologist: PIPO TAYLOR   Room: 1   Service Date: 2024        Sleep Testing Location: [facility]: [facility]    TECHNOLOGIST SLEEP STUDY PROCEDURE NOTE:   This sleep study is being conducted according to the policies and procedures outlined by the AAS accreditation standards.  The sleep study procedure and processes involved during this appointment was explained to the patient/patient’s family, questions were answered. The patient/family verbalized understanding.      The patient is a 68 y.o. year old male scheduled for aDiagnostic PSG Split night with montage of: PAP he arrived for his appointment.      The study that was ultimately completed was aDiagnostic PSG Split night with montage of: PAP    The full study Was completed.  Patient questionnaires completed?: yes     Consents signed? yes    Initial Fall Risk Screening:     Homero has not fallen in the last 6 months. his did not result in injury. Homero does not have a fear of falling. He does not need assistance with sitting, standing, or walking. he does not need assistance walking in his home. he does not need assistance in an unfamiliar setting. The patient is notusing an assistive device.     Brief Study observations: n/a    Deviation to order/protocol and reason: N/a     If PAP, which was preferred mask/pressure/mode: Pt met split criteria.      Other:None    After the procedure, the patient/family was informed to ensure followup with ordering clinician for testing results.      Technologist: PIPO TAYLOR

## 2024-06-26 ENCOUNTER — APPOINTMENT (OUTPATIENT)
Dept: GASTROENTEROLOGY | Facility: HOSPITAL | Age: 69
End: 2024-06-26
Payer: MEDICARE

## 2024-07-09 ENCOUNTER — OFFICE VISIT (OUTPATIENT)
Dept: CARDIOLOGY | Facility: CLINIC | Age: 69
End: 2024-07-09
Payer: MEDICARE

## 2024-07-09 VITALS
BODY MASS INDEX: 37.45 KG/M2 | HEIGHT: 68 IN | HEART RATE: 64 BPM | DIASTOLIC BLOOD PRESSURE: 70 MMHG | SYSTOLIC BLOOD PRESSURE: 134 MMHG | WEIGHT: 247.1 LBS | OXYGEN SATURATION: 96 %

## 2024-07-09 DIAGNOSIS — E78.5 DYSLIPIDEMIA: ICD-10-CM

## 2024-07-09 DIAGNOSIS — I10 ESSENTIAL HYPERTENSION: Primary | ICD-10-CM

## 2024-07-09 DIAGNOSIS — I47.29 PAROXYSMAL VENTRICULAR TACHYCARDIA (MULTI): ICD-10-CM

## 2024-07-09 DIAGNOSIS — I25.10 CORONARY ARTERY DISEASE INVOLVING NATIVE CORONARY ARTERY OF NATIVE HEART WITHOUT ANGINA PECTORIS: ICD-10-CM

## 2024-07-09 LAB
ATRIAL RATE: 60 BPM
P AXIS: 99 DEGREES
P OFFSET: 171 MS
P ONSET: 129 MS
PR INTERVAL: 118 MS
Q ONSET: 188 MS
QRS COUNT: 10 BEATS
QRS DURATION: 154 MS
QT INTERVAL: 508 MS
QTC CALCULATION(BAZETT): 508 MS
QTC FREDERICIA: 508 MS
R AXIS: 220 DEGREES
T AXIS: 60 DEGREES
T OFFSET: 442 MS
VENTRICULAR RATE: 60 BPM

## 2024-07-09 PROCEDURE — 1159F MED LIST DOCD IN RCRD: CPT | Performed by: INTERNAL MEDICINE

## 2024-07-09 PROCEDURE — 3078F DIAST BP <80 MM HG: CPT | Performed by: INTERNAL MEDICINE

## 2024-07-09 PROCEDURE — 99214 OFFICE O/P EST MOD 30 MIN: CPT | Performed by: INTERNAL MEDICINE

## 2024-07-09 PROCEDURE — 3044F HG A1C LEVEL LT 7.0%: CPT | Performed by: INTERNAL MEDICINE

## 2024-07-09 PROCEDURE — 3075F SYST BP GE 130 - 139MM HG: CPT | Performed by: INTERNAL MEDICINE

## 2024-07-09 PROCEDURE — 93010 ELECTROCARDIOGRAM REPORT: CPT | Performed by: INTERNAL MEDICINE

## 2024-07-09 PROCEDURE — 3008F BODY MASS INDEX DOCD: CPT | Performed by: INTERNAL MEDICINE

## 2024-07-09 PROCEDURE — 3048F LDL-C <100 MG/DL: CPT | Performed by: INTERNAL MEDICINE

## 2024-07-09 PROCEDURE — 93005 ELECTROCARDIOGRAM TRACING: CPT | Performed by: INTERNAL MEDICINE

## 2024-07-09 ASSESSMENT — ENCOUNTER SYMPTOMS: DEPRESSION: 0

## 2024-07-09 NOTE — PROGRESS NOTES
"Chief Complaint:   Follow-up and Pre-op Clearance (Colonoscopy)     History Of Present Illness:    Homero Brasher is a 68 y.o. male presenting for follow-up and for clearance for colonoscopy.  Clinically has been doing well, denies any chest pain, shortness of breath, dizziness.  No further palpitations following VT ablation last year.  He is set for routine screening colonoscopy, was told to stop his aspirin prior.     Last Recorded Vitals:  Vitals:    07/09/24 0801   BP: 134/70   BP Location: Left arm   Patient Position: Sitting   BP Cuff Size: Large adult   Pulse: 64   SpO2: 96%   Weight: 112 kg (247 lb 1.6 oz)   Height: 1.727 m (5' 8\")       Past Medical History:  He has a past medical history of Arrhythmia, Arthritis, Coronary artery disease, Heart disease, Hyperlipidemia, Hypertension, Myocardial infarction (Multi), Presence of cardiac pacemaker (03/01/2016), Sleep apnea, Thrombocytopenia (CMS-[facility]), and Type 2 diabetes mellitus (Multi).    He has no past medical history of Renal disease due to hypertension.    Past Surgical History:  He has a past surgical history that includes Cholecystectomy (10/20/2014); Coronary artery bypass graft (10/20/2014); Other surgical history (01/05/2022); Insert / replace / remove pacemaker; Cardiac catheterization; and Colonoscopy.      Social History:  He reports that he has never smoked. He has never used smokeless tobacco. He reports current alcohol use. He reports that he does not currently use drugs.    Family History:  Family History   Problem Relation Name Age of Onset    Other (CVA) Father      Stroke Father      Esophageal cancer Brother          Allergies:  Aspirin    Outpatient Medications:  Current Outpatient Medications   Medication Instructions    amLODIPine (NORVASC) 5 mg, oral, Daily    aspirin 81 mg, oral, Daily    atorvastatin (LIPITOR) 80 mg, oral, Daily    carvedilol (COREG) 25 mg, oral, 2 times daily    doxazosin (CARDURA) 8 mg, oral, Nightly    famotidine " (PEPCID) 20 mg, oral, Daily    flaxseed oiL 1,000 mg capsule TAKE AS DIRECTED.    furosemide (LASIX) 20 mg, oral, Every 12 hours    losartan-hydrochlorothiazide (Hyzaar) 100-25 mg tablet 1 tablet, oral, Daily    metFORMIN XR (GLUCOPHAGE-XR) 500 mg, oral, Daily with evening meal    Mounjaro 2.5 mg, subcutaneous, Once Weekly    multivit-min/ferrous fumarate (MULTI VITAMIN ORAL) as directed Orally    nitroglycerin (NITROSTAT) 0.4 mg, sublingual, Every 5 min PRN    potassium gluconate 595 mg (99 mg) tablet 2 tablets, oral, Daily       Physical Exam:  Constitutional:       Appearance: Healthy appearance. Not in distress.   Neck:      Vascular: No JVR. JVD normal.   Pulmonary:      Effort: Pulmonary effort is normal.      Breath sounds: Normal breath sounds. No wheezing. No rhonchi. No rales.   Chest:      Chest wall: Not tender to palpatation.   Cardiovascular:      Normal rate. Regular rhythm.      Murmurs: There is no murmur.   Edema:     Peripheral edema absent.   Abdominal:      General: Bowel sounds are normal.      Palpations: Abdomen is soft.      Tenderness: There is no abdominal tenderness.   Musculoskeletal: Normal range of motion. Skin:     General: Skin is warm and dry.   Neurological:      General: No focal deficit present.      Mental Status: Alert and oriented to person, place and time.         Last Labs:  CBC -  Lab Results   Component Value Date    WBC 9.1 06/20/2024    HGB 13.7 06/20/2024    HCT 40.5 (L) 06/20/2024    MCV 85 06/20/2024     (L) 06/20/2024       CMP -  Lab Results   Component Value Date    CALCIUM 9.3 06/22/2024    PROT 7.1 03/18/2024    ALBUMIN 4.4 03/18/2024    AST 25 03/18/2024    ALT 33 03/18/2024    ALKPHOS 103 03/18/2024    BILITOT 0.6 03/18/2024       LIPID PANEL -   Lab Results   Component Value Date    CHOL 112 (L) 03/18/2024    TRIG 157 (H) 03/18/2024    HDL 37.0 (L) 03/18/2024    CHHDL 3.0 03/18/2024       RENAL FUNCTION PANEL -   Lab Results   Component Value Date     GLUCOSE 92 06/22/2024     06/22/2024    K 3.0 (L) 06/22/2024     06/22/2024    CO2 29 06/22/2024    ANIONGAP 15 06/22/2024    BUN 30 (H) 06/22/2024    CREATININE 1.19 06/22/2024    GFRMALE 63 08/24/2023    CALCIUM 9.3 06/22/2024    ALBUMIN 4.4 03/18/2024        Lab Results   Component Value Date    HGBA1C 6.5 (H) 03/18/2024       Last Cardiology Tests:  ECG independently reviewed from today: Sinus rhythm with V pacing, rate 60     CMR 9/2023:  1. Severely limited exam secondary to susceptibility artifact caused   by pacer. Obtained measurements  demonstrate normal LV size with moderate to severely reduced systolic   function (LVEF =25%). The accuracy of  these numbers may be limited from the artifact and the patient might   benefit from updated echocardiography  as clinically warranted.     2. Subendocardial delayed enhancement involving the basal   anterolateral and inferolateral walls as  described, consistent with prior infarction. The infarct is   essentially transmural in the basal inferior  and inferolateral walls.      3. Dilated main pulmonary artery which can be seen with pulmonary   hypertension.      Cath 9/1/23:  1. Left main: 20-30% distal stenosis.  2. LAD: smaller caliber, 70% proximal disease, 100% mid-vessel stenosis.  3. Ramus: mild irregularities.  4. LCx: very small caliber, 70% ostial stenosis.  5. RCA: no signfiicant disease with patent distal stent, 30% mid-rPLV stenosis.  6. Grafts: (1) LIMA - LAD patent (2) SVG to rPDA occluded (3) SVG to ramus occluded.  7. LVEDP 16mmHg, no aortic stenosis on LV-Ao gradient.     Echo 3/16/23:  1. Left ventricular systolic function is normal with a 55-60% estimated ejection fraction.  2. Abnormal septal motion consistent with RV pacemaker.  3. Mild to moderate tricuspid regurgitation visualized.  4. There is mild mitral regurgitation.  5. Moderately elevated pulmonary artery pressure, estimated RVSP 52mmHg.     Echo 3/20/2019   1. The left  ventricular systolic function is normal with a 55-60% estimated  ejection fraction.   2. There is mild mitral and tricuspid regurgitation.     Echo 2/29/16:   1. The left ventricular systolic function is low normal with a 50-55% ejection fraction.   2. The left atrium is normal in size.   3. There is mild mitral and tricuspid regurgitation.   4. The estimated pulmonary artery pressure is mildly elevated with the RVSP at 39mmHg.        Cath September 2014:  * Left main: 40-50% distal tubular lesion.  * Left Anterior Descending Coronary Artery : Twin LAD system: LAD1:  50-60% proximal stenosis (2mm vessel). LAD2: mild diffuse disease  proximally with 100% mid-vessel occlusion  * Left Circumflex : 95 ostial stenosis.  * Right coronary artery: 90% distal, focal stenosis. 50% PLV stenosis at  angulation.  * Grafts: (1) LIMA to LAD2 patent (2) SVG to OM1 patent, native AV  groove circumflex with moderate diffuse disease (3) SVG to RCA? occluded  proximally  * LVEDP 19mmHg, no significant aortic stenosis on LV-Ao gradient  * Successful PCI to native distal RCA with one zotarolimus drug eluding  stent    Assessment/Plan   Mr. Brasher is a very pleasant 67 year old gentleman with PMH significant for HTN and CAD s/p CABG 2003 at [facility] (LIMA->LAD, SVG->OM1, SVG->RCA) who had sudden cardiac arrest 9/25/2014 (SVG-> RCA graft occluded, PCI to distal native RCA). Has has CRT-D for secondary prevention, s/p VT ablation 9/2023.  Doing well from CV standpoint--BP and lipids well controlled, NYHA II on exam.      Plan:  -Continue current aspirin, atorvastatin, amlodipine, carvedilol, losartan-HCTZ  -May proceed with colonoscopy, however must stay on aspirin due to history of drug-eluting stent (placed in setting of cardiac arrest)  -Follow-up in 6 months or sooner if needed      Camron Masters MD

## 2024-07-10 ENCOUNTER — HOSPITAL ENCOUNTER (OUTPATIENT)
Dept: CARDIOLOGY | Facility: CLINIC | Age: 69
Discharge: HOME | End: 2024-07-10
Payer: MEDICARE

## 2024-07-10 DIAGNOSIS — I49.01 VF (VENTRICULAR FIBRILLATION) (MULTI): ICD-10-CM

## 2024-07-10 PROCEDURE — 93284 PRGRMG EVAL IMPLANTABLE DFB: CPT

## 2024-07-10 PROCEDURE — 93284 PRGRMG EVAL IMPLANTABLE DFB: CPT | Performed by: INTERNAL MEDICINE

## 2024-07-11 PROCEDURE — RXMED WILLOW AMBULATORY MEDICATION CHARGE

## 2024-07-13 ENCOUNTER — PHARMACY VISIT (OUTPATIENT)
Dept: PHARMACY | Facility: CLINIC | Age: 69
End: 2024-07-13
Payer: COMMERCIAL

## 2024-07-23 NOTE — PROGRESS NOTES
"[facility]  Followup Visit Note    HISTORY OF PRESENT ILLNESS   Homero Brasher is a 68 y.o. male who presents to a [facility] for followup.       Current History    His sleep study showed severe sleep apnea with AHI of 47 and spo2 jonatan of 85%.    He did use CPAP previously but had difficulty tolerating due to the pressure feeling too high.  He felt okay when he was on CPAP during the titration portion of the sleep study.  Used a negative or a nasal mask which he found fairly comfortable.    He is open to CPAP therapy again for blood pressure and for symptom of frequent waking at night.    Previous visit 05/01/2024  OBSTRUCTIVE SLEEP APNEA / FREQUENT WAKING  -Ordering sleep study to evaluate  -Discussed lower pressure, EPR, nasal interface may help with tolerance of CPAP    BMI>35  -Body mass index is 38.77 kg/m².  today  -With sufficient weight loss may no longer require treatment for HAYDEN    HYPERTENSION  BP Readings from Last 3 Encounters:   05/01/24 133/83   03/20/24 130/80   02/20/24 113/74      -Well controlled with current medications     Sleep Scales:  ESS: 2     REVIEW OF SYSTEMS    All other systems negative      PHYSICAL EXAM     VITAL SIGNS: /71   Pulse 57   Ht 1.727 m (5' 8\")   Wt 110 kg (242 lb)   SpO2 96%   BMI 36.80 kg/m²      PREVIOUS WEIGHTS:  Wt Readings from Last 3 Encounters:   07/24/24 110 kg (242 lb)   07/09/24 112 kg (247 lb 1.6 oz)   06/24/24 112 kg (246 lb 14.6 oz)       Constitutional: Alert and oriented, cooperative, no obvious distress   HEENT: Non icteric or anemic, EOM WNL bilaterally   Neck: Supple, no JVD, no goiter, no adenopathy, no rigidity  Extremities: No clubbing, no LL edema   Neuromuscular: Cranial nerves grossly intact, no focal deficits     RESULTS/DATA     No results found for: \"IRON\", \"TRANSFERRIN\", \"IRONSAT\", \"TIBC\", \"FERRITIN\"      ASSESSMENT/PLAN     Mr. Brasher is a 68 y.o. male and returns in " followup for the following issues:    OBSTRUCTIVE SLEEP APNEA / FREQUENT WAKING  -Reviewed test results with patient  -Will order new CPAP from [facility] with pressure 4-12 cm H2O EPR 3, ramp of 30 minutes  -Discussed mask options and common tolerance issues  -He liked kashif nasal mask - recommend he start with this  -Goal of CPAP includes less waking at night, better blood pressure control    BMI>35  -Body mass index is 36.8 kg/m².  Today, down from 38 at last visit  -With sufficient weight loss may no longer require treatment for HAYDEN     HYPERTENSION  BP Readings from Last 3 Encounters:   07/24/24 108/71   07/09/24 134/70   06/24/24 130/89      -May benefit from CPAP therapy     Followup 31-90 days after starting CPAP

## 2024-07-24 ENCOUNTER — OFFICE VISIT (OUTPATIENT)
Dept: SLEEP MEDICINE | Facility: CLINIC | Age: 69
End: 2024-07-24
Payer: MEDICARE

## 2024-07-24 VITALS
HEIGHT: 68 IN | OXYGEN SATURATION: 96 % | HEART RATE: 57 BPM | BODY MASS INDEX: 36.68 KG/M2 | WEIGHT: 242 LBS | DIASTOLIC BLOOD PRESSURE: 71 MMHG | SYSTOLIC BLOOD PRESSURE: 108 MMHG

## 2024-07-24 DIAGNOSIS — G47.33 OSA (OBSTRUCTIVE SLEEP APNEA): Primary | ICD-10-CM

## 2024-07-24 DIAGNOSIS — I10 PRIMARY HYPERTENSION: ICD-10-CM

## 2024-07-24 PROCEDURE — 3078F DIAST BP <80 MM HG: CPT | Performed by: PHYSICIAN ASSISTANT

## 2024-07-24 PROCEDURE — 3008F BODY MASS INDEX DOCD: CPT | Performed by: PHYSICIAN ASSISTANT

## 2024-07-24 PROCEDURE — 1126F AMNT PAIN NOTED NONE PRSNT: CPT | Performed by: PHYSICIAN ASSISTANT

## 2024-07-24 PROCEDURE — 99213 OFFICE O/P EST LOW 20 MIN: CPT | Performed by: PHYSICIAN ASSISTANT

## 2024-07-24 PROCEDURE — 1160F RVW MEDS BY RX/DR IN RCRD: CPT | Performed by: PHYSICIAN ASSISTANT

## 2024-07-24 PROCEDURE — 3044F HG A1C LEVEL LT 7.0%: CPT | Performed by: PHYSICIAN ASSISTANT

## 2024-07-24 PROCEDURE — 3048F LDL-C <100 MG/DL: CPT | Performed by: PHYSICIAN ASSISTANT

## 2024-07-24 PROCEDURE — G2211 COMPLEX E/M VISIT ADD ON: HCPCS | Performed by: PHYSICIAN ASSISTANT

## 2024-07-24 PROCEDURE — 3074F SYST BP LT 130 MM HG: CPT | Performed by: PHYSICIAN ASSISTANT

## 2024-07-24 PROCEDURE — 1036F TOBACCO NON-USER: CPT | Performed by: PHYSICIAN ASSISTANT

## 2024-07-24 PROCEDURE — 1159F MED LIST DOCD IN RCRD: CPT | Performed by: PHYSICIAN ASSISTANT

## 2024-07-24 ASSESSMENT — PATIENT HEALTH QUESTIONNAIRE - PHQ9
2. FEELING DOWN, DEPRESSED OR HOPELESS: NOT AT ALL
SUM OF ALL RESPONSES TO PHQ9 QUESTIONS 1 & 2: 0
1. LITTLE INTEREST OR PLEASURE IN DOING THINGS: NOT AT ALL

## 2024-07-24 ASSESSMENT — SLEEP AND FATIGUE QUESTIONNAIRES
HOW LIKELY ARE YOU TO NOD OFF OR FALL ASLEEP WHILE SITTING QUIETLY AFTER LUNCH WITHOUT ALCOHOL: WOULD NEVER DOZE
HOW LIKELY ARE YOU TO NOD OFF OR FALL ASLEEP WHILE SITTING AND READING: WOULD NEVER DOZE
HOW LIKELY ARE YOU TO NOD OFF OR FALL ASLEEP WHILE LYING DOWN TO REST IN THE AFTERNOON WHEN CIRCUMSTANCES PERMIT: SLIGHT CHANCE OF DOZING
HOW LIKELY ARE YOU TO NOD OFF OR FALL ASLEEP WHILE WATCHING TV: SLIGHT CHANCE OF DOZING
ESS-CHAD TOTAL SCORE: 2
SITING INACTIVE IN A PUBLIC PLACE LIKE A CLASS ROOM OR A MOVIE THEATER: WOULD NEVER DOZE
HOW LIKELY ARE YOU TO NOD OFF OR FALL ASLEEP IN A CAR, WHILE STOPPED FOR A FEW MINUTES IN TRAFFIC: WOULD NEVER DOZE
HOW LIKELY ARE YOU TO NOD OFF OR FALL ASLEEP WHEN YOU ARE A PASSENGER IN A CAR FOR AN HOUR WITHOUT A BREAK: WOULD NEVER DOZE
HOW LIKELY ARE YOU TO NOD OFF OR FALL ASLEEP WHILE SITTING AND TALKING TO SOMEONE: WOULD NEVER DOZE

## 2024-07-24 ASSESSMENT — PAIN SCALES - GENERAL: PAINLEVEL: 0-NO PAIN

## 2024-07-24 ASSESSMENT — LIFESTYLE VARIABLES
SKIP TO QUESTIONS 9-10: 1
HOW OFTEN DO YOU HAVE SIX OR MORE DRINKS ON ONE OCCASION: NEVER
HOW MANY STANDARD DRINKS CONTAINING ALCOHOL DO YOU HAVE ON A TYPICAL DAY: 1 OR 2
AUDIT-C TOTAL SCORE: 1
HOW OFTEN DO YOU HAVE A DRINK CONTAINING ALCOHOL: MONTHLY OR LESS

## 2024-07-24 NOTE — PATIENT INSTRUCTIONS
Good seeing you today,    Order sent to [facility] with auto pressure 4-12 cm H2O. If you feel uncomfortable with pressure settings let me know and I can change them online.    Some mask options I recommend    Resmed N30i nasal mask (tube on top of head) - good option if you toss and turn a lot  Resmed N30 nasal mask (tube in front)  Pan carrie fx nasal pillow mask (tube in front)  Resmed P10 nasal pillow mask (tube in front)  Resmed P30i nasal mask (tube on top of head) - good option if you toss and turn a lot    You can change humidity settings based on comfort    We will plan on seeing you back in 31-90 days for a required compliance appointment. Try to use at least 4 hours per night for >70% of nights.    Geoff Brown PA-C    IMPORTANT PHONE NUMBERS     [phone] ([phone])  [employer] [facility]): [phone]  For clinical questions and refilling prescriptions: [phone]  Dana Bales (For [employer]/Stephanie): P: [phone]

## 2024-08-27 ENCOUNTER — APPOINTMENT (OUTPATIENT)
Dept: CARDIOLOGY | Facility: CLINIC | Age: 69
End: 2024-08-27
Payer: MEDICARE

## 2024-09-14 DIAGNOSIS — I25.10 CORONARY ARTERY DISEASE INVOLVING NATIVE CORONARY ARTERY OF NATIVE HEART WITHOUT ANGINA PECTORIS: ICD-10-CM

## 2024-09-14 DIAGNOSIS — E78.2 MIXED HYPERLIPIDEMIA: ICD-10-CM

## 2024-09-14 DIAGNOSIS — I50.32 CHRONIC DIASTOLIC HEART FAILURE: ICD-10-CM

## 2024-09-16 RX ORDER — LOSARTAN POTASSIUM AND HYDROCHLOROTHIAZIDE 25; 100 MG/1; MG/1
1 TABLET ORAL DAILY
Qty: 90 TABLET | Refills: 3 | Status: SHIPPED | OUTPATIENT
Start: 2024-09-16

## 2024-10-01 ENCOUNTER — LAB (OUTPATIENT)
Dept: LAB | Facility: LAB | Age: 69
End: 2024-10-01
Payer: MEDICARE

## 2024-10-01 DIAGNOSIS — E11.69 TYPE 2 DIABETES MELLITUS WITH OTHER SPECIFIED COMPLICATION, WITHOUT LONG-TERM CURRENT USE OF INSULIN: ICD-10-CM

## 2024-10-01 DIAGNOSIS — E66.01 CLASS 2 SEVERE OBESITY WITH SERIOUS COMORBIDITY AND BODY MASS INDEX (BMI) OF 38.0 TO 38.9 IN ADULT, UNSPECIFIED OBESITY TYPE: ICD-10-CM

## 2024-10-01 DIAGNOSIS — E66.812 CLASS 2 SEVERE OBESITY WITH SERIOUS COMORBIDITY AND BODY MASS INDEX (BMI) OF 38.0 TO 38.9 IN ADULT, UNSPECIFIED OBESITY TYPE: ICD-10-CM

## 2024-10-01 DIAGNOSIS — I10 PRIMARY HYPERTENSION: ICD-10-CM

## 2024-10-01 DIAGNOSIS — Z01.89 ENCOUNTER FOR ROUTINE LABORATORY TESTING: ICD-10-CM

## 2024-10-01 DIAGNOSIS — E55.9 VITAMIN D DEFICIENCY: ICD-10-CM

## 2024-10-01 DIAGNOSIS — D69.6 THROMBOCYTOPENIA (CMS-HCC): ICD-10-CM

## 2024-10-01 DIAGNOSIS — Z12.5 ENCOUNTER FOR PROSTATE CANCER SCREENING: ICD-10-CM

## 2024-10-01 LAB
25(OH)D3 SERPL-MCNC: 49 NG/ML (ref 30–100)
ALBUMIN SERPL BCP-MCNC: 4.2 G/DL (ref 3.4–5)
ALP SERPL-CCNC: 97 U/L (ref 33–136)
ALT SERPL W P-5'-P-CCNC: 39 U/L (ref 10–52)
ANION GAP SERPL CALCULATED.3IONS-SCNC: 12 MMOL/L (ref 10–20)
AST SERPL W P-5'-P-CCNC: 26 U/L (ref 9–39)
BASOPHILS # BLD AUTO: 0.05 X10*3/UL (ref 0–0.1)
BASOPHILS NFR BLD AUTO: 0.7 %
BILIRUB DIRECT SERPL-MCNC: 0.1 MG/DL (ref 0–0.3)
BILIRUB SERPL-MCNC: 0.8 MG/DL (ref 0–1.2)
BUN SERPL-MCNC: 20 MG/DL (ref 6–23)
CALCIUM SERPL-MCNC: 9.5 MG/DL (ref 8.6–10.3)
CHLORIDE SERPL-SCNC: 101 MMOL/L (ref 98–107)
CO2 SERPL-SCNC: 32 MMOL/L (ref 21–32)
CREAT SERPL-MCNC: 1.18 MG/DL (ref 0.5–1.3)
CREAT UR-MCNC: 198 MG/DL (ref 20–370)
EGFRCR SERPLBLD CKD-EPI 2021: 67 ML/MIN/1.73M*2
EOSINOPHIL # BLD AUTO: 0.32 X10*3/UL (ref 0–0.7)
EOSINOPHIL NFR BLD AUTO: 4.3 %
ERYTHROCYTE [DISTWIDTH] IN BLOOD BY AUTOMATED COUNT: 14 % (ref 11.5–14.5)
EST. AVERAGE GLUCOSE BLD GHB EST-MCNC: 126 MG/DL
GLUCOSE SERPL-MCNC: 118 MG/DL (ref 74–99)
HBA1C MFR BLD: 6 %
HCT VFR BLD AUTO: 38.4 % (ref 41–52)
HGB BLD-MCNC: 13.2 G/DL (ref 13.5–17.5)
IMM GRANULOCYTES # BLD AUTO: 0.01 X10*3/UL (ref 0–0.7)
IMM GRANULOCYTES NFR BLD AUTO: 0.1 % (ref 0–0.9)
LYMPHOCYTES # BLD AUTO: 2.07 X10*3/UL (ref 1.2–4.8)
LYMPHOCYTES NFR BLD AUTO: 27.5 %
MCH RBC QN AUTO: 29.5 PG (ref 26–34)
MCHC RBC AUTO-ENTMCNC: 34.4 G/DL (ref 32–36)
MCV RBC AUTO: 86 FL (ref 80–100)
MICROALBUMIN UR-MCNC: 8.9 MG/L
MICROALBUMIN/CREAT UR: 4.5 UG/MG CREAT
MONOCYTES # BLD AUTO: 0.67 X10*3/UL (ref 0.1–1)
MONOCYTES NFR BLD AUTO: 8.9 %
NEUTROPHILS # BLD AUTO: 4.4 X10*3/UL (ref 1.2–7.7)
NEUTROPHILS NFR BLD AUTO: 58.5 %
NRBC BLD-RTO: 0 /100 WBCS (ref 0–0)
PLATELET # BLD AUTO: 148 X10*3/UL (ref 150–450)
POTASSIUM SERPL-SCNC: 3.2 MMOL/L (ref 3.5–5.3)
PROT SERPL-MCNC: 7.1 G/DL (ref 6.4–8.2)
PSA SERPL-MCNC: 0.57 NG/ML
RBC # BLD AUTO: 4.48 X10*6/UL (ref 4.5–5.9)
SODIUM SERPL-SCNC: 142 MMOL/L (ref 136–145)
WBC # BLD AUTO: 7.5 X10*3/UL (ref 4.4–11.3)

## 2024-10-01 PROCEDURE — G0103 PSA SCREENING: HCPCS

## 2024-10-01 PROCEDURE — 82043 UR ALBUMIN QUANTITATIVE: CPT

## 2024-10-01 PROCEDURE — 82248 BILIRUBIN DIRECT: CPT

## 2024-10-01 PROCEDURE — 80053 COMPREHEN METABOLIC PANEL: CPT

## 2024-10-01 PROCEDURE — 85025 COMPLETE CBC W/AUTO DIFF WBC: CPT

## 2024-10-01 PROCEDURE — 82570 ASSAY OF URINE CREATININE: CPT

## 2024-10-01 PROCEDURE — 36415 COLL VENOUS BLD VENIPUNCTURE: CPT

## 2024-10-01 PROCEDURE — 82306 VITAMIN D 25 HYDROXY: CPT

## 2024-10-01 PROCEDURE — 83036 HEMOGLOBIN GLYCOSYLATED A1C: CPT

## 2024-10-02 ENCOUNTER — OFFICE VISIT (OUTPATIENT)
Dept: PRIMARY CARE | Facility: CLINIC | Age: 69
End: 2024-10-02
Payer: MEDICARE

## 2024-10-02 VITALS
OXYGEN SATURATION: 98 % | HEART RATE: 60 BPM | TEMPERATURE: 96.7 F | WEIGHT: 243 LBS | DIASTOLIC BLOOD PRESSURE: 64 MMHG | HEIGHT: 68 IN | BODY MASS INDEX: 36.83 KG/M2 | SYSTOLIC BLOOD PRESSURE: 138 MMHG

## 2024-10-02 DIAGNOSIS — E78.2 MIXED HYPERLIPIDEMIA: ICD-10-CM

## 2024-10-02 DIAGNOSIS — I50.32 CHRONIC DIASTOLIC HEART FAILURE: ICD-10-CM

## 2024-10-02 DIAGNOSIS — E11.9 TYPE 2 DIABETES MELLITUS WITHOUT COMPLICATION, WITHOUT LONG-TERM CURRENT USE OF INSULIN (MULTI): ICD-10-CM

## 2024-10-02 DIAGNOSIS — K21.9 GASTROESOPHAGEAL REFLUX DISEASE WITHOUT ESOPHAGITIS: ICD-10-CM

## 2024-10-02 DIAGNOSIS — G47.33 OSA (OBSTRUCTIVE SLEEP APNEA): ICD-10-CM

## 2024-10-02 DIAGNOSIS — E66.01 CLASS 2 SEVERE OBESITY WITH SERIOUS COMORBIDITY AND BODY MASS INDEX (BMI) OF 36.0 TO 36.9 IN ADULT, UNSPECIFIED OBESITY TYPE: ICD-10-CM

## 2024-10-02 DIAGNOSIS — E66.01 CLASS 2 SEVERE OBESITY WITH SERIOUS COMORBIDITY AND BODY MASS INDEX (BMI) OF 36.0 TO 36.9 IN ADULT, UNSPECIFIED OBESITY TYPE: Primary | ICD-10-CM

## 2024-10-02 DIAGNOSIS — Z01.89 ENCOUNTER FOR ROUTINE LABORATORY TESTING: ICD-10-CM

## 2024-10-02 DIAGNOSIS — E66.812 CLASS 2 SEVERE OBESITY WITH SERIOUS COMORBIDITY AND BODY MASS INDEX (BMI) OF 36.0 TO 36.9 IN ADULT, UNSPECIFIED OBESITY TYPE: Primary | ICD-10-CM

## 2024-10-02 DIAGNOSIS — I25.10 CORONARY ARTERY DISEASE INVOLVING NATIVE CORONARY ARTERY OF NATIVE HEART WITHOUT ANGINA PECTORIS: ICD-10-CM

## 2024-10-02 DIAGNOSIS — F32.A DEPRESSION, UNSPECIFIED DEPRESSION TYPE: ICD-10-CM

## 2024-10-02 DIAGNOSIS — Z00.00 ANNUAL PHYSICAL EXAM: ICD-10-CM

## 2024-10-02 DIAGNOSIS — I10 PRIMARY HYPERTENSION: ICD-10-CM

## 2024-10-02 DIAGNOSIS — N40.0 BENIGN PROSTATIC HYPERPLASIA WITHOUT LOWER URINARY TRACT SYMPTOMS: ICD-10-CM

## 2024-10-02 DIAGNOSIS — D69.6 THROMBOCYTOPENIA (CMS-HCC): ICD-10-CM

## 2024-10-02 DIAGNOSIS — Z23 ENCOUNTER FOR IMMUNIZATION: ICD-10-CM

## 2024-10-02 DIAGNOSIS — E66.812 CLASS 2 SEVERE OBESITY WITH SERIOUS COMORBIDITY AND BODY MASS INDEX (BMI) OF 36.0 TO 36.9 IN ADULT, UNSPECIFIED OBESITY TYPE: ICD-10-CM

## 2024-10-02 PROCEDURE — 90662 IIV NO PRSV INCREASED AG IM: CPT | Performed by: STUDENT IN AN ORGANIZED HEALTH CARE EDUCATION/TRAINING PROGRAM

## 2024-10-02 PROCEDURE — RXMED WILLOW AMBULATORY MEDICATION CHARGE

## 2024-10-02 PROCEDURE — 99214 OFFICE O/P EST MOD 30 MIN: CPT | Performed by: STUDENT IN AN ORGANIZED HEALTH CARE EDUCATION/TRAINING PROGRAM

## 2024-10-02 RX ORDER — TIRZEPATIDE 5 MG/.5ML
5 INJECTION, SOLUTION SUBCUTANEOUS WEEKLY
Qty: 2 ML | Refills: 7 | Status: SHIPPED | OUTPATIENT
Start: 2024-10-02

## 2024-10-02 RX ORDER — DOXAZOSIN 8 MG/1
8 TABLET ORAL NIGHTLY
Qty: 90 TABLET | Refills: 3 | Status: SHIPPED | OUTPATIENT
Start: 2024-10-02

## 2024-10-02 RX ORDER — TIRZEPATIDE 5 MG/.5ML
5 INJECTION, SOLUTION SUBCUTANEOUS WEEKLY
Start: 2024-10-02 | End: 2024-10-02 | Stop reason: SDUPTHER

## 2024-10-02 RX ORDER — FUROSEMIDE 40 MG/1
40 TABLET ORAL DAILY
Status: SHIPPED
Start: 2024-10-02

## 2024-10-02 RX ORDER — MAGNESIUM HYDROXIDE 400 MG/5ML
3 SUSPENSION, ORAL (FINAL DOSE FORM) ORAL DAILY
Status: SHIPPED
Start: 2024-10-02

## 2024-10-02 RX ORDER — AMLODIPINE BESYLATE 5 MG/1
5 TABLET ORAL DAILY
Qty: 90 TABLET | Refills: 3 | Status: SHIPPED | OUTPATIENT
Start: 2024-10-02 | End: 2025-10-02

## 2024-10-02 ASSESSMENT — PAIN SCALES - GENERAL: PAINLEVEL: 0-NO PAIN

## 2024-10-02 ASSESSMENT — ENCOUNTER SYMPTOMS
CARDIOVASCULAR NEGATIVE: 1
CONSTITUTIONAL NEGATIVE: 1
GASTROINTESTINAL NEGATIVE: 1
RESPIRATORY NEGATIVE: 1

## 2024-10-02 ASSESSMENT — PATIENT HEALTH QUESTIONNAIRE - PHQ9
1. LITTLE INTEREST OR PLEASURE IN DOING THINGS: NOT AT ALL
2. FEELING DOWN, DEPRESSED OR HOPELESS: NOT AT ALL
SUM OF ALL RESPONSES TO PHQ9 QUESTIONS 1 AND 2: 0

## 2024-10-02 NOTE — PATIENT INSTRUCTIONS
- Significant medication and problem list reconciliation done today.  - Patient had labwork done for this appointment. Discussed today. See plan below.  - Patient's potassium remains mildly low. Likely just related to medications. Does not require further evaluation at this time. Will increase the dosage of his OTC supplement.  - Remainder of his labwork looked great.  - Will order labwork for the patient's next appointment. Encouraged the patient to get this labwork done one week prior to the next appointment.  - Blood pressure at goal today.  - Encouraged continued diet, exercise, and lifestyle modification.  - Encouraged continued use of CPAP.  - Overall, the patient feels well. Denies any symptoms or concerns at this time.

## 2024-10-02 NOTE — PROGRESS NOTES
[facility]: [facility]  PROGRESS NOTE      Homero Brasher is a 68 y.o. male that is presenting today for Follow-up.    Assessment/Plan   - Significant medication and problem list reconciliation done today.  - Patient had labwork done for this appointment. Discussed today. See plan below.  - Patient's potassium remains mildly low. Likely just related to medications. Does not require further evaluation at this time. Will increase the dosage of his OTC supplement.  - Remainder of his labwork looked great.  - Will order labwork for the patient's next appointment. Encouraged the patient to get this labwork done one week prior to the next appointment.  - Blood pressure at goal today.  - Encouraged continued diet, exercise, and lifestyle modification.  - Encouraged continued use of CPAP.  - Overall, the patient feels well. Denies any symptoms or concerns at this time.    Diagnoses and all orders for this visit:  Class 2 severe obesity with serious comorbidity and body mass index (BMI) of 36.0 to 36.9 in adult, unspecified obesity type  -     tirzepatide (Mounjaro) 5 mg/0.5 mL pen injector; Inject 5 mg under the skin 1 (one) time per week.  Coronary artery disease involving native coronary artery of native heart without angina pectoris  -     tirzepatide (Mounjaro) 5 mg/0.5 mL pen injector; Inject 5 mg under the skin 1 (one) time per week.  -     potassium gluconate 595 mg (99 mg) tablet; Take 3 tablets by mouth once daily.  Type 2 diabetes mellitus without complication, without long-term current use of insulin (Multi)  -     tirzepatide (Mounjaro) 5 mg/0.5 mL pen injector; Inject 5 mg under the skin 1 (one) time per week.  -     Hemoglobin A1C; Future  Benign prostatic hyperplasia without lower urinary tract symptoms  -     doxazosin (Cardura) 8 mg tablet; Take 1 tablet (8 mg) by mouth once daily at bedtime.  Gastroesophageal reflux disease without esophagitis  Depression, unspecified depression  type  Thrombocytopenia (CMS-HCC)  -     CBC and Auto Differential; Future  HAYDEN (obstructive sleep apnea)  -     tirzepatide (Mounjaro) 5 mg/0.5 mL pen injector; Inject 5 mg under the skin 1 (one) time per week.  Chronic diastolic heart failure  -     tirzepatide (Mounjaro) 5 mg/0.5 mL pen injector; Inject 5 mg under the skin 1 (one) time per week.  -     potassium gluconate 595 mg (99 mg) tablet; Take 3 tablets by mouth once daily.  -     furosemide (Lasix) 40 mg tablet; Take 1 tablet (40 mg) by mouth once daily.  Mixed hyperlipidemia  -     Hepatic Function Panel; Future  -     Lipid Panel; Future  Primary hypertension  -     tirzepatide (Mounjaro) 5 mg/0.5 mL pen injector; Inject 5 mg under the skin 1 (one) time per week.  -     potassium gluconate 595 mg (99 mg) tablet; Take 3 tablets by mouth once daily.  -     amLODIPine (Norvasc) 5 mg tablet; Take 1 tablet (5 mg) by mouth once daily.  -     doxazosin (Cardura) 8 mg tablet; Take 1 tablet (8 mg) by mouth once daily at bedtime.  -     CBC and Auto Differential; Future  -     Basic Metabolic Panel; Future  Encounter for routine laboratory testing  Annual physical exam  -     Follow Up In Primary Care  -     Follow Up In Primary Care; Future  Encounter for immunization  -     Flu vaccine, trivalent, preservative free, HIGH-DOSE, age 65y+ (Fluzone)    Current Outpatient Medications   Medication Instructions    amLODIPine (NORVASC) 5 mg, oral, Daily    aspirin 81 mg, oral, Daily    atorvastatin (LIPITOR) 80 mg, oral, Daily    carvedilol (COREG) 25 mg, oral, 2 times daily    doxazosin (CARDURA) 8 mg, oral, Nightly    famotidine (PEPCID) 20 mg, oral, Daily    flaxseed oiL 1,000 mg capsule TAKE AS DIRECTED.    furosemide (LASIX) 40 mg, oral, Daily    losartan-hydrochlorothiazide (Hyzaar) 100-25 mg tablet 1 tablet, oral, Daily    metFORMIN XR (GLUCOPHAGE-XR) 500 mg, oral, Daily with evening meal    Mounjaro 5 mg, subcutaneous, Weekly    multivit-min/ferrous fumarate  (MULTI VITAMIN ORAL) as directed Orally    nitroglycerin (NITROSTAT) 0.4 mg, sublingual, Every 5 min PRN    potassium gluconate 595 mg (99 mg) tablet 3 tablets, oral, Daily     Subjective   - The patient otherwise feels well and denies any acute symptoms or concerns at this time.  - The patient denies any changes or progression of their chronic medical problems.  - The patient denies any problems or concerns with their medications.      Review of Systems   Constitutional: Negative.    Respiratory: Negative.     Cardiovascular: Negative.    Gastrointestinal: Negative.    All other systems reviewed and are negative.     Objective   Vitals:    10/02/24 1108   BP: 138/64   Pulse: 60   Temp: 35.9 °C (96.7 °F)   SpO2: 98%      Body mass index is 36.95 kg/m².  Physical Exam  Vitals and nursing note reviewed.   Constitutional:       General: He is not in acute distress.  Neck:      Vascular: No carotid bruit.   Cardiovascular:      Rate and Rhythm: Normal rate and regular rhythm.      Heart sounds: Normal heart sounds.   Pulmonary:      Effort: Pulmonary effort is normal.      Breath sounds: Normal breath sounds.   Musculoskeletal:         General: No swelling.   Neurological:      Mental Status: He is alert. Mental status is at baseline.   Psychiatric:         Mood and Affect: Mood normal.       Diagnostic Results   Lab Results   Component Value Date    GLUCOSE 118 (H) 10/01/2024    CALCIUM 9.5 10/01/2024     10/01/2024    K 3.2 (L) 10/01/2024    CO2 32 10/01/2024     10/01/2024    BUN 20 10/01/2024    CREATININE 1.18 10/01/2024     Lab Results   Component Value Date    ALT 39 10/01/2024    AST 26 10/01/2024    ALKPHOS 97 10/01/2024    BILITOT 0.8 10/01/2024     Lab Results   Component Value Date    WBC 7.5 10/01/2024    HGB 13.2 (L) 10/01/2024    HCT 38.4 (L) 10/01/2024    MCV 86 10/01/2024     (L) 10/01/2024     Lab Results   Component Value Date    CHOL 112 (L) 03/18/2024    CHOL 93 (L) 09/11/2023     "CHOL 100 (L) 12/14/2022     Lab Results   Component Value Date    HDL 37.0 (L) 03/18/2024    HDL 32 (L) 09/11/2023    HDL 33 (L) 12/14/2022     Lab Results   Component Value Date    LDLCALC 44 (L) 03/18/2024    LDLCALC 42 (L) 09/11/2023    LDLCALC 43 (L) 12/14/2022     Lab Results   Component Value Date    TRIG 157 (H) 03/18/2024    TRIG 94 09/11/2023    TRIG 118 12/14/2022     No components found for: \"CHOLHDL\"  Lab Results   Component Value Date    HGBA1C 6.0 (H) 10/01/2024     Other labs not included in the list above were reviewed either before or during this encounter.    History    Past Medical History:   Diagnosis Date    Arrhythmia     Arthritis     Coronary artery disease     Heart disease     Hyperlipidemia     Hypertension     Myocardial infarction (Multi)     Presence of cardiac pacemaker 03/01/2016    History of cardiac pacemaker    Sleep apnea     Thrombocytopenia (CMS-HCC)     Type 2 diabetes mellitus      Past Surgical History:   Procedure Laterality Date    CARDIAC CATHETERIZATION      CHOLECYSTECTOMY  10/20/2014    Cholecystectomy    COLONOSCOPY  09/26/2024    CORONARY ARTERY BYPASS GRAFT  10/20/2014    CABG    INSERT / REPLACE / REMOVE PACEMAKER      OTHER SURGICAL HISTORY  01/05/2022    Permanent pacemaker insertion     Family History   Problem Relation Name Age of Onset    Other (CVA) Father      Stroke Father      Esophageal cancer Brother       Social History     Socioeconomic History    Marital status: Single     Spouse name: Not on file    Number of children: 0    Years of education: Not on file    Highest education level: Not on file   Occupational History    Occupation: PART TIME   Tobacco Use    Smoking status: Never     Passive exposure: Never    Smokeless tobacco: Never   Vaping Use    Vaping status: Never Used   Substance and Sexual Activity    Alcohol use: Yes     Comment: SOCIALLY    Drug use: Not Currently    Sexual activity: Defer   Other Topics Concern    Not on file   Social " History Narrative    Not on file     Social Determinants of Health     Financial Resource Strain: Not on file   Food Insecurity: Not on file   Transportation Needs: Not on file   Physical Activity: Not on file   Stress: Not on file   Social Connections: Not on file   Intimate Partner Violence: Not on file   Housing Stability: Not on file     Allergies   Allergen Reactions    Aspirin Nausea/vomiting     Current Outpatient Medications on File Prior to Visit   Medication Sig Dispense Refill    aspirin 81 mg EC tablet Take 1 tablet (81 mg) by mouth once daily.      atorvastatin (Lipitor) 80 mg tablet Take 1 tablet (80 mg) by mouth once daily. 90 tablet 3    carvedilol (Coreg) 25 mg tablet Take 1 tablet (25 mg) by mouth 2 times a day. 180 tablet 3    famotidine (Pepcid) 20 mg tablet Take 1 tablet (20 mg) by mouth once daily. 90 tablet 3    flaxseed oiL 1,000 mg capsule TAKE AS DIRECTED.      losartan-hydrochlorothiazide (Hyzaar) 100-25 mg tablet TAKE 1 TABLET BY MOUTH EVERY DAY 90 tablet 3    metFORMIN  mg 24 hr tablet Take 1 tablet (500 mg) by mouth once daily in the evening. Take with meals. 90 tablet 3    multivit-min/ferrous fumarate (MULTI VITAMIN ORAL) as directed Orally      nitroglycerin (Nitrostat) 0.4 mg SL tablet Place 1 tablet (0.4 mg) under the tongue every 5 minutes if needed for chest pain. 30 tablet 0    [DISCONTINUED] amLODIPine (Norvasc) 5 mg tablet Take 1 tablet (5 mg) by mouth once daily. 90 tablet 3    [DISCONTINUED] doxazosin (Cardura) 8 mg tablet Take 1 tablet (8 mg) by mouth once daily at bedtime. 90 tablet 3    [DISCONTINUED] furosemide (Lasix) 20 mg tablet TAKE 1 TABLET BY MOUTH EVERY 12 HOURS 180 tablet 3    [DISCONTINUED] potassium gluconate 595 mg (99 mg) tablet Take 2 tablets by mouth once daily.      [DISCONTINUED] tirzepatide (Mounjaro) 2.5 mg/0.5 mL pen injector Inject 2.5 mg under the skin 1 (one) time per week. (Patient taking differently: Inject 2.5 mg under the skin 1 (one) time  per week. WEDNESDAY) 2 mL 2     No current facility-administered medications on file prior to visit.     Immunization History   Administered Date(s) Administered    Flu vaccine (IIV4), preservative free *Check age/dose* 09/14/2017    Flu vaccine, quadrivalent, high-dose, preservative free, age 65y+ (FLUZONE) 09/30/2020, 12/08/2021, 10/12/2022, 10/03/2023    Flu vaccine, quadrivalent, recombinant, preservative free, adult (FLUBLOK) 09/21/2020    Flu vaccine, trivalent, preservative free, HIGH-DOSE, age 65y+ (Fluzone) 12/08/2021, 10/12/2022    Influenza, injectable, MDCK, quadrivalent 10/15/2019    Influenza, injectable, quadrivalent 10/06/2016, 10/22/2018, 10/01/2019    Influenza, seasonal, injectable 10/13/2010, 12/17/2011, 12/15/2012, 01/18/2014, 09/22/2014    Moderna SARS-CoV-2 Vaccination 04/16/2021, 05/13/2021    Pneumococcal conjugate vaccine, 13-valent (PREVNAR 13) 09/19/2016    Pneumococcal conjugate vaccine, 20-valent (PREVNAR 20) 03/20/2024    Pneumococcal polysaccharide vaccine, 23-valent, age 2 years and older (PNEUMOVAX 23) 10/01/2004    Td vaccine, age 7 years and older (TDVAX) 05/01/2006    Tdap vaccine, age 7 year and older (BOOSTRIX, ADACEL) 09/19/2016     Patient's medical history was reviewed and updated either before or during this encounter.       Satish Sandoval MD

## 2024-10-03 ENCOUNTER — PHARMACY VISIT (OUTPATIENT)
Dept: PHARMACY | Facility: CLINIC | Age: 69
End: 2024-10-03
Payer: COMMERCIAL

## 2024-10-23 ENCOUNTER — HOSPITAL ENCOUNTER (OUTPATIENT)
Dept: CARDIOLOGY | Facility: CLINIC | Age: 69
Discharge: HOME | End: 2024-10-23
Payer: MEDICARE

## 2024-10-23 DIAGNOSIS — I49.01 VF (VENTRICULAR FIBRILLATION) (MULTI): ICD-10-CM

## 2024-10-23 DIAGNOSIS — Z95.810 PRESENCE OF AUTOMATIC CARDIOVERTER/DEFIBRILLATOR (AICD): ICD-10-CM

## 2024-10-23 PROCEDURE — 93284 PRGRMG EVAL IMPLANTABLE DFB: CPT

## 2024-10-23 PROCEDURE — 93284 PRGRMG EVAL IMPLANTABLE DFB: CPT | Performed by: INTERNAL MEDICINE

## 2024-10-25 PROCEDURE — RXMED WILLOW AMBULATORY MEDICATION CHARGE

## 2024-10-29 ENCOUNTER — PHARMACY VISIT (OUTPATIENT)
Dept: PHARMACY | Facility: CLINIC | Age: 69
End: 2024-10-29
Payer: COMMERCIAL

## 2024-11-26 PROCEDURE — RXMED WILLOW AMBULATORY MEDICATION CHARGE

## 2024-11-29 ENCOUNTER — PHARMACY VISIT (OUTPATIENT)
Dept: PHARMACY | Facility: CLINIC | Age: 69
End: 2024-11-29
Payer: COMMERCIAL

## 2024-12-18 DIAGNOSIS — I50.32 CHRONIC DIASTOLIC HEART FAILURE: ICD-10-CM

## 2024-12-18 DIAGNOSIS — I25.10 CORONARY ARTERY DISEASE INVOLVING NATIVE CORONARY ARTERY OF NATIVE HEART WITHOUT ANGINA PECTORIS: ICD-10-CM

## 2024-12-18 DIAGNOSIS — E78.2 MIXED HYPERLIPIDEMIA: ICD-10-CM

## 2024-12-18 RX ORDER — LOSARTAN POTASSIUM AND HYDROCHLOROTHIAZIDE 25; 100 MG/1; MG/1
1 TABLET ORAL DAILY
Qty: 90 TABLET | Refills: 3 | Status: SHIPPED | OUTPATIENT
Start: 2024-12-18

## 2024-12-23 PROCEDURE — RXMED WILLOW AMBULATORY MEDICATION CHARGE

## 2024-12-24 ENCOUNTER — PHARMACY VISIT (OUTPATIENT)
Dept: PHARMACY | Facility: CLINIC | Age: 69
End: 2024-12-24
Payer: COMMERCIAL

## 2025-01-17 ENCOUNTER — PHARMACY VISIT (OUTPATIENT)
Dept: PHARMACY | Facility: CLINIC | Age: 70
End: 2025-01-17
Payer: COMMERCIAL

## 2025-01-17 PROCEDURE — RXMED WILLOW AMBULATORY MEDICATION CHARGE

## 2025-01-21 ENCOUNTER — OFFICE VISIT (OUTPATIENT)
Dept: CARDIOLOGY | Facility: CLINIC | Age: 70
End: 2025-01-21
Payer: MEDICARE

## 2025-01-21 VITALS
DIASTOLIC BLOOD PRESSURE: 70 MMHG | BODY MASS INDEX: 35.01 KG/M2 | HEART RATE: 67 BPM | WEIGHT: 231 LBS | SYSTOLIC BLOOD PRESSURE: 117 MMHG | HEIGHT: 68 IN | OXYGEN SATURATION: 98 %

## 2025-01-21 DIAGNOSIS — Z95.810 ICD (IMPLANTABLE CARDIOVERTER-DEFIBRILLATOR) IN PLACE: ICD-10-CM

## 2025-01-21 DIAGNOSIS — E78.2 MIXED HYPERLIPIDEMIA: ICD-10-CM

## 2025-01-21 DIAGNOSIS — I25.10 CORONARY ARTERY DISEASE INVOLVING NATIVE CORONARY ARTERY OF NATIVE HEART WITHOUT ANGINA PECTORIS: Primary | ICD-10-CM

## 2025-01-21 DIAGNOSIS — I10 PRIMARY HYPERTENSION: ICD-10-CM

## 2025-01-21 DIAGNOSIS — I42.9 CARDIOMYOPATHY, UNSPECIFIED TYPE (MULTI): ICD-10-CM

## 2025-01-21 PROCEDURE — G2211 COMPLEX E/M VISIT ADD ON: HCPCS | Performed by: INTERNAL MEDICINE

## 2025-01-21 PROCEDURE — 1159F MED LIST DOCD IN RCRD: CPT | Performed by: INTERNAL MEDICINE

## 2025-01-21 PROCEDURE — 99214 OFFICE O/P EST MOD 30 MIN: CPT | Performed by: INTERNAL MEDICINE

## 2025-01-21 PROCEDURE — 3008F BODY MASS INDEX DOCD: CPT | Performed by: INTERNAL MEDICINE

## 2025-01-21 PROCEDURE — 93005 ELECTROCARDIOGRAM TRACING: CPT | Performed by: INTERNAL MEDICINE

## 2025-01-21 PROCEDURE — 1036F TOBACCO NON-USER: CPT | Performed by: INTERNAL MEDICINE

## 2025-01-21 PROCEDURE — 3074F SYST BP LT 130 MM HG: CPT | Performed by: INTERNAL MEDICINE

## 2025-01-21 PROCEDURE — 3078F DIAST BP <80 MM HG: CPT | Performed by: INTERNAL MEDICINE

## 2025-01-21 NOTE — PROGRESS NOTES
"Chief Complaint:   No chief complaint on file.     History Of Present Illness:    Homero Brasher is a 69 y.o. male presenting for follow-up.  Overall doing well from a cardiac standpoint.  Notes occasional dizziness, \"nothing too bad\".  Denies any angina, edema, progressive dyspnea.  Compliant with medications.     Last Recorded Vitals:  Vitals:    01/21/25 0801   BP: 117/70   BP Location: Right arm   Patient Position: Sitting   Pulse: 67   SpO2: 98%   Weight: 105 kg (231 lb)   Height: 1.727 m (5' 8\")       Past Medical History:  He has a past medical history of Arrhythmia, Arthritis, Coronary artery disease, Heart disease, Hyperlipidemia, Hypertension, Myocardial infarction (Multi), Presence of cardiac pacemaker (03/01/2016), Sleep apnea, Thrombocytopenia (CMS-[facility]), and Type 2 diabetes mellitus.    He has no past medical history of Renal disease due to hypertension.    Past Surgical History:  He has a past surgical history that includes Cholecystectomy (10/20/2014); Coronary artery bypass graft (10/20/2014); Other surgical history (01/05/2022); Insert / replace / remove pacemaker; Cardiac catheterization; and Colonoscopy (09/26/2024).      Social History:  He reports that he has never smoked. He has never been exposed to tobacco smoke. He has never used smokeless tobacco. He reports current alcohol use. He reports that he does not currently use drugs.    Family History:  Family History   Problem Relation Name Age of Onset    Other (CVA) Father      Stroke Father      Esophageal cancer Brother          Allergies:  Aspirin    Outpatient Medications:  Current Outpatient Medications   Medication Instructions    amLODIPine (NORVASC) 5 mg, oral, Daily    aspirin 81 mg, oral, Daily    atorvastatin (LIPITOR) 80 mg, oral, Daily    carvedilol (COREG) 25 mg, oral, 2 times daily    doxazosin (CARDURA) 8 mg, oral, Nightly    famotidine (PEPCID) 20 mg, oral, Daily    flaxseed oiL 1,000 mg capsule TAKE AS DIRECTED.    furosemide " (LASIX) 40 mg, oral, Daily    losartan-hydrochlorothiazide (Hyzaar) 100-25 mg tablet 1 tablet, oral, Daily    metFORMIN XR (GLUCOPHAGE-XR) 500 mg, oral, Daily with evening meal    Mounjaro 5 mg, subcutaneous, Weekly    multivit-min/ferrous fumarate (MULTI VITAMIN ORAL) as directed Orally    nitroglycerin (NITROSTAT) 0.4 mg, sublingual, Every 5 min PRN    potassium gluconate 595 mg (99 mg) tablet 3 tablets, oral, Daily       Physical Exam:  Constitutional:       Appearance: Healthy appearance. Not in distress.   Neck:      Vascular: No JVR. JVD normal.   Pulmonary:      Effort: Pulmonary effort is normal.      Breath sounds: Normal breath sounds. No wheezing. No rhonchi. No rales.   Chest:      Chest wall: Not tender to palpatation.   Cardiovascular:      Normal rate. Regular rhythm.      Murmurs: There is no murmur.   Edema:     Thigh: bilateral trace edema of the thigh.     Pretibial: bilateral trace edema of the pretibial area.     Ankle: bilateral trace edema of the ankle.     Feet: bilateral trace edema of the feet.  Abdominal:      General: Bowel sounds are normal.      Palpations: Abdomen is soft.      Tenderness: There is no abdominal tenderness.   Musculoskeletal: Normal range of motion. Skin:     General: Skin is warm and dry.   Neurological:      General: No focal deficit present.      Mental Status: Alert and oriented to person, place and time.           Last Labs:  CBC -  Lab Results   Component Value Date    WBC 7.5 10/01/2024    HGB 13.2 (L) 10/01/2024    HCT 38.4 (L) 10/01/2024    MCV 86 10/01/2024     (L) 10/01/2024       CMP -  Lab Results   Component Value Date    CALCIUM 9.5 10/01/2024    PROT 7.1 10/01/2024    ALBUMIN 4.2 10/01/2024    AST 26 10/01/2024    ALT 39 10/01/2024    ALKPHOS 97 10/01/2024    BILITOT 0.8 10/01/2024       LIPID PANEL -   Lab Results   Component Value Date    CHOL 112 (L) 03/18/2024    TRIG 157 (H) 03/18/2024    HDL 37.0 (L) 03/18/2024    CHHDL 3.0 03/18/2024        RENAL FUNCTION PANEL -   Lab Results   Component Value Date    GLUCOSE 118 (H) 10/01/2024     10/01/2024    K 3.2 (L) 10/01/2024     10/01/2024    CO2 32 10/01/2024    ANIONGAP 12 10/01/2024    BUN 20 10/01/2024    CREATININE 1.18 10/01/2024    GFRMALE 63 08/24/2023    CALCIUM 9.5 10/01/2024    ALBUMIN 4.2 10/01/2024        Lab Results   Component Value Date    HGBA1C 6.0 (H) 10/01/2024       Last Cardiology Tests:  ECG independently reviewed from today: V paced with a PVC, rate 65      CMR 9/2023:  1. Severely limited exam secondary to susceptibility artifact caused   by pacer. Obtained measurements  demonstrate normal LV size with moderate to severely reduced systolic   function (LVEF =25%). The accuracy of  these numbers may be limited from the artifact and the patient might   benefit from updated echocardiography  as clinically warranted.     2. Subendocardial delayed enhancement involving the basal   anterolateral and inferolateral walls as  described, consistent with prior infarction. The infarct is   essentially transmural in the basal inferior  and inferolateral walls.      3. Dilated main pulmonary artery which can be seen with pulmonary   hypertension.      Cath 9/1/23:  1. Left main: 20-30% distal stenosis.  2. LAD: smaller caliber, 70% proximal disease, 100% mid-vessel stenosis.  3. Ramus: mild irregularities.  4. LCx: very small caliber, 70% ostial stenosis.  5. RCA: no signfiicant disease with patent distal stent, 30% mid-rPLV stenosis.  6. Grafts: (1) LIMA - LAD patent (2) SVG to rPDA occluded (3) SVG to ramus occluded.  7. LVEDP 16mmHg, no aortic stenosis on LV-Ao gradient.     Echo 3/16/23:  1. Left ventricular systolic function is normal with a 55-60% estimated ejection fraction.  2. Abnormal septal motion consistent with RV pacemaker.  3. Mild to moderate tricuspid regurgitation visualized.  4. There is mild mitral regurgitation.  5. Moderately elevated pulmonary artery pressure,  estimated RVSP 52mmHg.        Cath September 2014:  * Left main: 40-50% distal tubular lesion.  * Left Anterior Descending Coronary Artery : Twin LAD system: LAD1:  50-60% proximal stenosis (2mm vessel). LAD2: mild diffuse disease  proximally with 100% mid-vessel occlusion  * Left Circumflex : 95 ostial stenosis.  * Right coronary artery: 90% distal, focal stenosis. 50% PLV stenosis at  angulation.  * Grafts: (1) LIMA to LAD2 patent (2) SVG to OM1 patent, native AV  groove circumflex with moderate diffuse disease (3) SVG to RCA? occluded  proximally  * LVEDP 19mmHg, no significant aortic stenosis on LV-Ao gradient  * Successful PCI to native distal RCA with one zotarolimus drug eluding  stent    Assessment/Plan   Mr. Brasher is a very pleasant 67 year old gentleman with PMH significant for HTN and CAD s/p CABG 2003 at [facility] (LIMA->LAD, SVG->OM1, SVG->RCA) who had sudden cardiac arrest 9/25/2014 (SVG-> RCA graft occluded, PCI to distal native RCA). Has has CRT-D for secondary prevention, s/p VT ablation 9/2023.  Doing well from CV standpoint--BP and lipids well controlled, NYHA II on exam.      Plan:  -Continue current aspirin, atorvastatin, amlodipine, carvedilol, losartan-hydrochlorothiazide, and furosemide  -followup with device clinic  -Follow-up in 6 months or sooner if needed      Camron Masters MD

## 2025-01-22 ENCOUNTER — HOSPITAL ENCOUNTER (OUTPATIENT)
Dept: CARDIOLOGY | Facility: CLINIC | Age: 70
Discharge: HOME | End: 2025-01-22
Payer: MEDICARE

## 2025-01-22 DIAGNOSIS — I49.01 VF (VENTRICULAR FIBRILLATION) (MULTI): ICD-10-CM

## 2025-01-22 DIAGNOSIS — Z95.810 PRESENCE OF AUTOMATIC CARDIOVERTER/DEFIBRILLATOR (AICD): ICD-10-CM

## 2025-01-22 LAB
ATRIAL RATE: 65 BPM
Q ONSET: 188 MS
QRS COUNT: 10 BEATS
QRS DURATION: 156 MS
QT INTERVAL: 524 MS
QTC CALCULATION(BAZETT): 544 MS
QTC FREDERICIA: 537 MS
R AXIS: 217 DEGREES
T AXIS: 71 DEGREES
T OFFSET: 450 MS
VENTRICULAR RATE: 65 BPM

## 2025-01-22 PROCEDURE — 93284 PRGRMG EVAL IMPLANTABLE DFB: CPT | Performed by: INTERNAL MEDICINE

## 2025-01-22 PROCEDURE — 93284 PRGRMG EVAL IMPLANTABLE DFB: CPT

## 2025-02-14 PROCEDURE — RXMED WILLOW AMBULATORY MEDICATION CHARGE

## 2025-02-18 ENCOUNTER — PHARMACY VISIT (OUTPATIENT)
Dept: PHARMACY | Facility: CLINIC | Age: 70
End: 2025-02-18
Payer: COMMERCIAL

## 2025-03-04 DIAGNOSIS — E66.812 CLASS 2 SEVERE OBESITY WITH SERIOUS COMORBIDITY AND BODY MASS INDEX (BMI) OF 38.0 TO 38.9 IN ADULT, UNSPECIFIED OBESITY TYPE: ICD-10-CM

## 2025-03-04 DIAGNOSIS — E11.69 TYPE 2 DIABETES MELLITUS WITH OTHER SPECIFIED COMPLICATION, WITHOUT LONG-TERM CURRENT USE OF INSULIN: Primary | ICD-10-CM

## 2025-03-04 DIAGNOSIS — E66.01 CLASS 2 SEVERE OBESITY WITH SERIOUS COMORBIDITY AND BODY MASS INDEX (BMI) OF 38.0 TO 38.9 IN ADULT, UNSPECIFIED OBESITY TYPE: ICD-10-CM

## 2025-03-11 ENCOUNTER — APPOINTMENT (OUTPATIENT)
Dept: PHARMACY | Facility: HOSPITAL | Age: 70
End: 2025-03-11
Payer: MEDICARE

## 2025-03-11 DIAGNOSIS — E66.812 CLASS 2 SEVERE OBESITY WITH SERIOUS COMORBIDITY AND BODY MASS INDEX (BMI) OF 38.0 TO 38.9 IN ADULT, UNSPECIFIED OBESITY TYPE: ICD-10-CM

## 2025-03-11 DIAGNOSIS — E66.01 CLASS 2 SEVERE OBESITY WITH SERIOUS COMORBIDITY AND BODY MASS INDEX (BMI) OF 38.0 TO 38.9 IN ADULT, UNSPECIFIED OBESITY TYPE: ICD-10-CM

## 2025-03-11 DIAGNOSIS — E66.812 CLASS 2 SEVERE OBESITY WITH SERIOUS COMORBIDITY AND BODY MASS INDEX (BMI) OF 36.0 TO 36.9 IN ADULT, UNSPECIFIED OBESITY TYPE: ICD-10-CM

## 2025-03-11 DIAGNOSIS — E11.69 TYPE 2 DIABETES MELLITUS WITH OTHER SPECIFIED COMPLICATION, WITHOUT LONG-TERM CURRENT USE OF INSULIN: ICD-10-CM

## 2025-03-11 DIAGNOSIS — E11.9 TYPE 2 DIABETES MELLITUS WITHOUT COMPLICATION, WITHOUT LONG-TERM CURRENT USE OF INSULIN (MULTI): ICD-10-CM

## 2025-03-11 DIAGNOSIS — E66.01 CLASS 2 SEVERE OBESITY WITH SERIOUS COMORBIDITY AND BODY MASS INDEX (BMI) OF 36.0 TO 36.9 IN ADULT, UNSPECIFIED OBESITY TYPE: ICD-10-CM

## 2025-03-11 PROCEDURE — RXMED WILLOW AMBULATORY MEDICATION CHARGE

## 2025-03-11 RX ORDER — TIRZEPATIDE 5 MG/.5ML
5 INJECTION, SOLUTION SUBCUTANEOUS WEEKLY
Qty: 6 ML | Refills: 3 | Status: SHIPPED | OUTPATIENT
Start: 2025-03-11

## 2025-03-11 NOTE — PROGRESS NOTES
MEDICATION MANAGEMENT    Homero Brasher is a 69 y.o. male who was referred by Satish Sandoval MD to complete medication reconciliation and discuss T2DM with the clinical pharmacist.  A comprehensive medication review was completed with the patient via telephone today.  Patient reports financial barrier with current medication.      MEDICATION HISTORY  Allergies   Allergen Reactions    Aspirin Nausea/vomiting     Tolerates the coated aspirin - takes daily     Current Outpatient Medications on File Prior to Visit   Medication Sig Dispense Refill    amLODIPine (Norvasc) 5 mg tablet Take 1 tablet (5 mg) by mouth once daily. 90 tablet 3    aspirin 81 mg EC tablet Take 1 tablet (81 mg) by mouth once daily.      atorvastatin (Lipitor) 80 mg tablet Take 1 tablet (80 mg) by mouth once daily. 90 tablet 3    carvedilol (Coreg) 25 mg tablet Take 1 tablet (25 mg) by mouth 2 times a day. 180 tablet 3    doxazosin (Cardura) 8 mg tablet Take 1 tablet (8 mg) by mouth once daily at bedtime. 90 tablet 3    famotidine (Pepcid) 20 mg tablet Take 1 tablet (20 mg) by mouth once daily. 90 tablet 3    flaxseed oiL 1,000 mg capsule TAKE AS DIRECTED.      furosemide (Lasix) 40 mg tablet Take 1 tablet (40 mg) by mouth once daily.      losartan-hydrochlorothiazide (Hyzaar) 100-25 mg tablet Take 1 tablet by mouth once daily. 90 tablet 3    metFORMIN  mg 24 hr tablet Take 1 tablet (500 mg) by mouth once daily in the evening. Take with meals. 90 tablet 3    multivit-min/ferrous fumarate (MULTI VITAMIN ORAL) as directed Orally      nitroglycerin (Nitrostat) 0.4 mg SL tablet Place 1 tablet (0.4 mg) under the tongue every 5 minutes if needed for chest pain. 30 tablet 0    potassium gluconate 595 mg (99 mg) tablet Take 3 tablets by mouth once daily.      tirzepatide (Mounjaro) 5 mg/0.5 mL pen injector Inject 5 mg under the skin 1 (one) time per week. 2 mL 7     No current facility-administered medications on file prior to visit.     Patient  is not currently checking blood sugars  No lows - discussed signs and symptoms and how to treat  Patient reports some mild GI symptoms every one in a while, does not interfere with daily life, resolves on its own     Patient Assistance Screening (VAF)  Patient verbally reports monthly or yearly income which is less than 400% federal poverty level.  Application for program has been submitted for the following medications: Mounjaro  Patient has been informed that program team will be reaching out to them to discuss necessary documentation, instructed to answer phone/return voicemail.   Patient aware this process may take up to 6 weeks.   If approved medication must be filled through Replaced by [facility] pharmacy and may be picked up or mailed to patient.       LABS  There were no vitals taken for this visit.   Lab Results   Component Value Date    HGBA1C 6.0 (H) 10/01/2024    HGBA1C 6.5 (H) 03/18/2024    HGBA1C 6.3 (H) 09/11/2023     Lab Results   Component Value Date    BILITOT 0.8 10/01/2024    CALCIUM 9.5 10/01/2024    CO2 32 10/01/2024     10/01/2024    CREATININE 1.18 10/01/2024    GLUCOSE 118 (H) 10/01/2024    ALKPHOS 97 10/01/2024    K 3.2 (L) 10/01/2024    PROT 7.1 10/01/2024     10/01/2024    AST 26 10/01/2024    ALT 39 10/01/2024    BUN 20 10/01/2024    ANIONGAP 12 10/01/2024    ALBUMIN 4.2 10/01/2024    GFRMALE 63 08/24/2023     Lab Results   Component Value Date    TRIG 157 (H) 03/18/2024    CHOL 112 (L) 03/18/2024    LDLCALC 44 (L) 03/18/2024    HDL 37.0 (L) 03/18/2024         Assessment/Plan    Comments/Recommendations to PCP:    Continue Metformin 500 mg once daily and Mounjaro 5 mg subQ once weekly for the treatment of type 2 diabetes. Patient is doing well on both medications. Most recent A1c is 6% (goal <7%).     Mounjaro Education:   - Counseled patient on MOA, expectations, side effects, duration of therapy, contraindications, administration, and monitoring parameters  - Answered all patient  questions and concerns  - Counseled patient on Mounjaro MOA, expectations, side effects, duration of therapy, administration, and monitoring parameters.  - Provided detailed dosing and administration counseling to ensure proper technique.   - Reviewed Mounjaro titration schedule, starting with 2.5 mg once weekly to a goal of 15 mg once weekly if tolerated  - Counseled patient on the benefits of GLP-1ra glycemic control and weight loss  - Reviewed storage requirements of Mounjaro when not in use, and when to administer the medication if a dose is missed.  - Advised patient that they may experience improved satiety after meals and portion sizes of meals may be reduced as doses of Mounjaro increase.    Will continue to monitor blood sugars, A1c, and adverse effects for continued treatment for type 2 diabetes.     Follow up with pharmacy yearly or sooner if needed  Follow up with PCP in 1 month    Yobani Santana, PharmD, BCPS    Verbal consent to manage patient's drug therapy was obtained from the patient and/or an individual authorized to act on behalf of a patient. They were informed they may decline to participate or withdraw from participation in pharmacy services at any time.

## 2025-03-12 ENCOUNTER — HOSPITAL ENCOUNTER (OUTPATIENT)
Dept: CARDIOLOGY | Facility: CLINIC | Age: 70
Discharge: HOME | End: 2025-03-12
Payer: MEDICARE

## 2025-03-12 DIAGNOSIS — I49.01 VF (VENTRICULAR FIBRILLATION) (MULTI): ICD-10-CM

## 2025-03-12 DIAGNOSIS — Z95.810 PRESENCE OF AUTOMATIC CARDIOVERTER/DEFIBRILLATOR (AICD): ICD-10-CM

## 2025-03-13 ENCOUNTER — PHARMACY VISIT (OUTPATIENT)
Dept: PHARMACY | Facility: CLINIC | Age: 70
End: 2025-03-13
Payer: COMMERCIAL

## 2025-04-03 DIAGNOSIS — E11.69 TYPE 2 DIABETES MELLITUS WITH OTHER SPECIFIED COMPLICATION, WITHOUT LONG-TERM CURRENT USE OF INSULIN: ICD-10-CM

## 2025-04-03 RX ORDER — METFORMIN HYDROCHLORIDE 500 MG/1
500 TABLET, EXTENDED RELEASE ORAL
Qty: 90 TABLET | Refills: 3 | Status: SHIPPED | OUTPATIENT
Start: 2025-04-03 | End: 2026-04-03

## 2025-04-08 LAB
ALBUMIN SERPL-MCNC: 4 G/DL (ref 3.6–5.1)
ALBUMIN/GLOB SERPL: 1.5 (CALC) (ref 1–2.5)
ALP SERPL-CCNC: 81 U/L (ref 35–144)
ALT SERPL-CCNC: 38 U/L (ref 9–46)
ANION GAP SERPL CALCULATED.4IONS-SCNC: 12 MMOL/L (CALC) (ref 7–17)
AST SERPL-CCNC: 28 U/L (ref 10–35)
BASOPHILS # BLD AUTO: 38 CELLS/UL (ref 0–200)
BASOPHILS NFR BLD AUTO: 0.6 %
BILIRUB DIRECT SERPL-MCNC: 0.2 MG/DL
BILIRUB INDIRECT SERPL-MCNC: 0.6 MG/DL (CALC) (ref 0.2–1.2)
BILIRUB SERPL-MCNC: 0.8 MG/DL (ref 0.2–1.2)
BUN SERPL-MCNC: 16 MG/DL (ref 7–25)
BUN/CREAT SERPL: ABNORMAL (CALC) (ref 6–22)
CALCIUM SERPL-MCNC: 9.1 MG/DL (ref 8.6–10.3)
CHLORIDE SERPL-SCNC: 102 MMOL/L (ref 98–110)
CHOLEST SERPL-MCNC: 99 MG/DL
CHOLEST/HDLC SERPL: 3 (CALC)
CO2 SERPL-SCNC: 28 MMOL/L (ref 20–32)
CREAT SERPL-MCNC: 0.98 MG/DL (ref 0.7–1.35)
EGFRCR SERPLBLD CKD-EPI 2021: 83 ML/MIN/1.73M2
EOSINOPHIL # BLD AUTO: 198 CELLS/UL (ref 15–500)
EOSINOPHIL NFR BLD AUTO: 3.1 %
ERYTHROCYTE [DISTWIDTH] IN BLOOD BY AUTOMATED COUNT: 14 % (ref 11–15)
EST. AVERAGE GLUCOSE BLD GHB EST-MCNC: 123 MG/DL
EST. AVERAGE GLUCOSE BLD GHB EST-SCNC: 6.8 MMOL/L
GLOBULIN SER CALC-MCNC: 2.7 G/DL (CALC) (ref 1.9–3.7)
GLUCOSE SERPL-MCNC: 97 MG/DL (ref 65–99)
HBA1C MFR BLD: 5.9 % OF TOTAL HGB
HCT VFR BLD AUTO: 40.6 % (ref 38.5–50)
HDLC SERPL-MCNC: 33 MG/DL
HGB BLD-MCNC: 13.1 G/DL (ref 13.2–17.1)
LDLC SERPL CALC-MCNC: 44 MG/DL (CALC)
LYMPHOCYTES # BLD AUTO: 1862 CELLS/UL (ref 850–3900)
LYMPHOCYTES NFR BLD AUTO: 29.1 %
MCH RBC QN AUTO: 28.4 PG (ref 27–33)
MCHC RBC AUTO-ENTMCNC: 32.3 G/DL (ref 32–36)
MCV RBC AUTO: 87.9 FL (ref 80–100)
MONOCYTES # BLD AUTO: 518 CELLS/UL (ref 200–950)
MONOCYTES NFR BLD AUTO: 8.1 %
NEUTROPHILS # BLD AUTO: 3782 CELLS/UL (ref 1500–7800)
NEUTROPHILS NFR BLD AUTO: 59.1 %
NONHDLC SERPL-MCNC: 66 MG/DL (CALC)
PLATELET # BLD AUTO: 133 THOUSAND/UL (ref 140–400)
PMV BLD REES-ECKER: 12 FL (ref 7.5–12.5)
POTASSIUM SERPL-SCNC: 3.2 MMOL/L (ref 3.5–5.3)
PROT SERPL-MCNC: 6.7 G/DL (ref 6.1–8.1)
RBC # BLD AUTO: 4.62 MILLION/UL (ref 4.2–5.8)
SODIUM SERPL-SCNC: 142 MMOL/L (ref 135–146)
TRIGL SERPL-MCNC: 137 MG/DL
WBC # BLD AUTO: 6.4 THOUSAND/UL (ref 3.8–10.8)

## 2025-04-09 ENCOUNTER — OFFICE VISIT (OUTPATIENT)
Dept: PRIMARY CARE | Facility: CLINIC | Age: 70
End: 2025-04-09
Payer: MEDICARE

## 2025-04-09 VITALS
SYSTOLIC BLOOD PRESSURE: 128 MMHG | WEIGHT: 238 LBS | DIASTOLIC BLOOD PRESSURE: 70 MMHG | BODY MASS INDEX: 36.07 KG/M2 | OXYGEN SATURATION: 96 % | HEART RATE: 74 BPM | HEIGHT: 68 IN

## 2025-04-09 DIAGNOSIS — Z12.11 ENCOUNTER FOR COLORECTAL CANCER SCREENING: ICD-10-CM

## 2025-04-09 DIAGNOSIS — D69.6 THROMBOCYTOPENIA (CMS-HCC): ICD-10-CM

## 2025-04-09 DIAGNOSIS — K86.3 PSEUDOCYST OF PANCREAS (HHS-HCC): ICD-10-CM

## 2025-04-09 DIAGNOSIS — Z00.00 ANNUAL PHYSICAL EXAM: Primary | ICD-10-CM

## 2025-04-09 DIAGNOSIS — I50.32 CHRONIC DIASTOLIC HEART FAILURE: ICD-10-CM

## 2025-04-09 DIAGNOSIS — Z23 ENCOUNTER FOR IMMUNIZATION: ICD-10-CM

## 2025-04-09 DIAGNOSIS — I25.10 CORONARY ARTERY DISEASE INVOLVING NATIVE CORONARY ARTERY OF NATIVE HEART WITHOUT ANGINA PECTORIS: ICD-10-CM

## 2025-04-09 DIAGNOSIS — K21.9 GASTROESOPHAGEAL REFLUX DISEASE WITHOUT ESOPHAGITIS: ICD-10-CM

## 2025-04-09 DIAGNOSIS — N40.0 BENIGN PROSTATIC HYPERPLASIA WITHOUT LOWER URINARY TRACT SYMPTOMS: ICD-10-CM

## 2025-04-09 DIAGNOSIS — F32.A DEPRESSION, UNSPECIFIED DEPRESSION TYPE: ICD-10-CM

## 2025-04-09 DIAGNOSIS — G47.33 OSA (OBSTRUCTIVE SLEEP APNEA): ICD-10-CM

## 2025-04-09 DIAGNOSIS — Z12.12 ENCOUNTER FOR COLORECTAL CANCER SCREENING: ICD-10-CM

## 2025-04-09 DIAGNOSIS — E11.9 TYPE 2 DIABETES MELLITUS WITHOUT COMPLICATION, WITHOUT LONG-TERM CURRENT USE OF INSULIN: ICD-10-CM

## 2025-04-09 DIAGNOSIS — R73.9 HYPERGLYCEMIA: ICD-10-CM

## 2025-04-09 DIAGNOSIS — E66.812 CLASS 2 SEVERE OBESITY WITH SERIOUS COMORBIDITY AND BODY MASS INDEX (BMI) OF 36.0 TO 36.9 IN ADULT, UNSPECIFIED OBESITY TYPE: ICD-10-CM

## 2025-04-09 DIAGNOSIS — M15.0 PRIMARY OSTEOARTHRITIS INVOLVING MULTIPLE JOINTS: ICD-10-CM

## 2025-04-09 DIAGNOSIS — E78.2 MIXED HYPERLIPIDEMIA: ICD-10-CM

## 2025-04-09 DIAGNOSIS — Z95.810 ICD (IMPLANTABLE CARDIOVERTER-DEFIBRILLATOR) IN PLACE: ICD-10-CM

## 2025-04-09 DIAGNOSIS — I10 PRIMARY HYPERTENSION: ICD-10-CM

## 2025-04-09 DIAGNOSIS — Z12.5 ENCOUNTER FOR PROSTATE CANCER SCREENING: ICD-10-CM

## 2025-04-09 DIAGNOSIS — E66.01 CLASS 2 SEVERE OBESITY WITH SERIOUS COMORBIDITY AND BODY MASS INDEX (BMI) OF 36.0 TO 36.9 IN ADULT, UNSPECIFIED OBESITY TYPE: ICD-10-CM

## 2025-04-09 DIAGNOSIS — Z71.89 COUNSELING REGARDING ADVANCED CARE PLANNING AND GOALS OF CARE: ICD-10-CM

## 2025-04-09 DIAGNOSIS — Z01.89 ENCOUNTER FOR ROUTINE LABORATORY TESTING: ICD-10-CM

## 2025-04-09 PROBLEM — M19.90 OA (OSTEOARTHRITIS): Status: ACTIVE | Noted: 2025-04-09

## 2025-04-09 PROCEDURE — 1126F AMNT PAIN NOTED NONE PRSNT: CPT | Performed by: STUDENT IN AN ORGANIZED HEALTH CARE EDUCATION/TRAINING PROGRAM

## 2025-04-09 PROCEDURE — 3074F SYST BP LT 130 MM HG: CPT | Performed by: STUDENT IN AN ORGANIZED HEALTH CARE EDUCATION/TRAINING PROGRAM

## 2025-04-09 PROCEDURE — 3008F BODY MASS INDEX DOCD: CPT | Performed by: STUDENT IN AN ORGANIZED HEALTH CARE EDUCATION/TRAINING PROGRAM

## 2025-04-09 PROCEDURE — 1170F FXNL STATUS ASSESSED: CPT | Performed by: STUDENT IN AN ORGANIZED HEALTH CARE EDUCATION/TRAINING PROGRAM

## 2025-04-09 PROCEDURE — 1159F MED LIST DOCD IN RCRD: CPT | Performed by: STUDENT IN AN ORGANIZED HEALTH CARE EDUCATION/TRAINING PROGRAM

## 2025-04-09 PROCEDURE — 1123F ACP DISCUSS/DSCN MKR DOCD: CPT | Performed by: STUDENT IN AN ORGANIZED HEALTH CARE EDUCATION/TRAINING PROGRAM

## 2025-04-09 PROCEDURE — 1036F TOBACCO NON-USER: CPT | Performed by: STUDENT IN AN ORGANIZED HEALTH CARE EDUCATION/TRAINING PROGRAM

## 2025-04-09 PROCEDURE — 4010F ACE/ARB THERAPY RXD/TAKEN: CPT | Performed by: STUDENT IN AN ORGANIZED HEALTH CARE EDUCATION/TRAINING PROGRAM

## 2025-04-09 PROCEDURE — 1158F ADVNC CARE PLAN TLK DOCD: CPT | Performed by: STUDENT IN AN ORGANIZED HEALTH CARE EDUCATION/TRAINING PROGRAM

## 2025-04-09 PROCEDURE — 99215 OFFICE O/P EST HI 40 MIN: CPT | Mod: 25 | Performed by: STUDENT IN AN ORGANIZED HEALTH CARE EDUCATION/TRAINING PROGRAM

## 2025-04-09 PROCEDURE — G0439 PPPS, SUBSEQ VISIT: HCPCS | Performed by: STUDENT IN AN ORGANIZED HEALTH CARE EDUCATION/TRAINING PROGRAM

## 2025-04-09 PROCEDURE — G2211 COMPLEX E/M VISIT ADD ON: HCPCS | Performed by: STUDENT IN AN ORGANIZED HEALTH CARE EDUCATION/TRAINING PROGRAM

## 2025-04-09 PROCEDURE — 99214 OFFICE O/P EST MOD 30 MIN: CPT | Performed by: STUDENT IN AN ORGANIZED HEALTH CARE EDUCATION/TRAINING PROGRAM

## 2025-04-09 PROCEDURE — 1160F RVW MEDS BY RX/DR IN RCRD: CPT | Performed by: STUDENT IN AN ORGANIZED HEALTH CARE EDUCATION/TRAINING PROGRAM

## 2025-04-09 PROCEDURE — 3078F DIAST BP <80 MM HG: CPT | Performed by: STUDENT IN AN ORGANIZED HEALTH CARE EDUCATION/TRAINING PROGRAM

## 2025-04-09 RX ORDER — DICLOFENAC SODIUM 10 MG/G
4 GEL TOPICAL 3 TIMES DAILY PRN
COMMUNITY
Start: 2025-04-09

## 2025-04-09 RX ORDER — ACETAMINOPHEN 500 MG
500-1000 TABLET ORAL 3 TIMES DAILY PRN
COMMUNITY
Start: 2025-04-09

## 2025-04-09 RX ORDER — TIRZEPATIDE 7.5 MG/.5ML
7.5 INJECTION, SOLUTION SUBCUTANEOUS WEEKLY
Start: 2025-04-09

## 2025-04-09 RX ORDER — SPIRONOLACTONE 25 MG/1
25 TABLET ORAL DAILY
Qty: 30 TABLET | Refills: 1 | Status: SHIPPED | OUTPATIENT
Start: 2025-04-09 | End: 2025-06-08

## 2025-04-09 RX ORDER — IBUPROFEN 200 MG
200-400 TABLET ORAL 3 TIMES DAILY PRN
COMMUNITY
Start: 2025-04-09

## 2025-04-09 RX ORDER — LOSARTAN POTASSIUM 100 MG/1
100 TABLET ORAL DAILY
Qty: 90 TABLET | Refills: 3 | Status: SHIPPED | OUTPATIENT
Start: 2025-04-09 | End: 2026-04-09

## 2025-04-09 ASSESSMENT — ENCOUNTER SYMPTOMS
CARDIOVASCULAR NEGATIVE: 1
HEMATOLOGIC/LYMPHATIC NEGATIVE: 1
CONSTITUTIONAL NEGATIVE: 1
NEUROLOGICAL NEGATIVE: 1
RESPIRATORY NEGATIVE: 1
ALLERGIC/IMMUNOLOGIC NEGATIVE: 1
PSYCHIATRIC NEGATIVE: 1
EYES NEGATIVE: 1
GASTROINTESTINAL NEGATIVE: 1
MUSCULOSKELETAL NEGATIVE: 1
ENDOCRINE NEGATIVE: 1

## 2025-04-09 ASSESSMENT — ACTIVITIES OF DAILY LIVING (ADL)
DRESSING: INDEPENDENT
TAKING_MEDICATION: INDEPENDENT
MANAGING_FINANCES: INDEPENDENT
BATHING: INDEPENDENT
DOING_HOUSEWORK: INDEPENDENT
GROCERY_SHOPPING: INDEPENDENT

## 2025-04-09 ASSESSMENT — PAIN SCALES - GENERAL: PAINLEVEL_OUTOF10: 0-NO PAIN

## 2025-04-09 NOTE — PROGRESS NOTES
[facility]: [facility]  MEDICARE WELLNESS EXAM      Homero MAYE Brasher is a 69 y.o. male that is presenting today for Annual Exam.    Assessment/Plan    - Overall, the patient feels well and denies any acute symptoms / concerns at this time.  - Blood pressure at goal today.  - Encouraged continued dietary, exercise, and lifestyle modification.  - Encouraged continued use of CPAP.  - Significant medication and problem list reconciliation done today.   - Discussed with the patient that we can increase the dosage of the mounjaro. This would not be for his sugars, but rather to further assist with weight loss.    Discussed routine and/or preventative care with the patient as outlined below:  - Labwork:   - Patient had labwork done for this appointment. Discussed today.   - Patient's potassium remains low despite significant potassium supplementation. I suspect that we are doing this to him with a combination of the hydrochlorothiazide and the furosemide. Discussed with the patient that I think that his blood pressure regimen could be optimized so that he is taking significantly less medications.   - Will get rid of the losartan-hydrochlorothiazide combination pill and just keep the losartan.  - Continue the carvedilol.  - For now, will eliminate both the hydrochlorothiazide and the amlodipine and attempt to replace them with spironolactone. If we are doing this, then I suspect that we can also get rid of the potassium supplementation.   - Will recheck blood pressure with the patient in a couple of weeks and will have a repeat BMP done at that time.  - The remainder of the patient's labwork looked great.  - Will order labwork for the patient's next appointment. Encouraged the patient to get this labwork done one week prior to the next appointment.  - Imaging:   - Colorectal Cancer: Patient had this done last year, is no longer recommended due to age.  - Cardiovascular Imaging: It has been several  years since we have done an echocardiogram.  - Encouraged the patient to continue to get their routine annual diabetic retinal exam.  - Immunizations:   - Encouraged the patient to get their shingles (shingrix) immunizations.  - Discussed the RSV immunization.     ADVANCED CARE PLANNING  Advanced Care Planning was discussed with patient.  Encouraged the patient to confirm that Living Will and Healthcare Power of  (HCPoA) are accurate and up to date.  Encouraged the patient to confirm that our office be provided a copy of any documentation in the event that anything changes.    Diagnoses and all orders for this visit:  Annual physical exam  -     Follow Up In Primary Care  Counseling regarding advanced care planning and goals of care  Encounter for colorectal cancer screening  Encounter for routine laboratory testing  Encounter for prostate cancer screening  Encounter for immunization  Class 2 severe obesity with serious comorbidity and body mass index (BMI) of 36.0 to 36.9 in adult, unspecified obesity type  -     tirzepatide (Mounjaro) 7.5 mg/0.5 mL pen injector; Inject 7.5 mg under the skin 1 (one) time per week.  Coronary artery disease involving native coronary artery of native heart without angina pectoris  -     Transthoracic Echo (TTE) Complete; Future  -     perflutren lipid microspheres (Definity) injection 0.5-10 mL of dilution  -     sulfur hexafluoride microsphr (Lumason) injection 24.28 mg  -     perflutren protein A microsphere (Optison) injection 0.5 mL  -     Insert and maintain peripheral IV; Standing  -     losartan (Cozaar) 100 mg tablet; Take 1 tablet (100 mg) by mouth once daily.  -     Basic Metabolic Panel; Future  -     Follow Up In Primary Care; Future  Type 2 diabetes mellitus without complication, without long-term current use of insulin  -     tirzepatide (Mounjaro) 7.5 mg/0.5 mL pen injector; Inject 7.5 mg under the skin 1 (one) time per week.  -     Hemoglobin A1C;  Future  Benign prostatic hyperplasia without lower urinary tract symptoms  Gastroesophageal reflux disease without esophagitis  ICD (implantable cardioverter-defibrillator) in place  -     Transthoracic Echo (TTE) Complete; Future  -     perflutren lipid microspheres (Definity) injection 0.5-10 mL of dilution  -     sulfur hexafluoride microsphr (Lumason) injection 24.28 mg  -     perflutren protein A microsphere (Optison) injection 0.5 mL  -     Insert and maintain peripheral IV; Standing  Primary osteoarthritis involving multiple joints  -     acetaminophen (Tylenol Extra Strength) 500 mg tablet; Take 1-2 tablets (500-1,000 mg) by mouth 3 times a day as needed for mild pain (1 - 3), moderate pain (4 - 6) or fever (temp greater than 38.0 C).  -     ibuprofen 200 mg tablet; Take 1-2 tablets (200-400 mg) by mouth 3 times a day as needed for mild pain (1 - 3), moderate pain (4 - 6) or headaches.  -     diclofenac sodium (Voltaren) 1 % gel; Apply 4.5 inches (4 g) topically 3 times a day as needed (pain).  Depression, unspecified depression type  Pseudocyst of pancreas (HHS-HCC)  Thrombocytopenia (CMS-HCC)  -     CBC and Auto Differential; Future  HAYDEN (obstructive sleep apnea)  Chronic diastolic heart failure  -     Transthoracic Echo (TTE) Complete; Future  -     perflutren lipid microspheres (Definity) injection 0.5-10 mL of dilution  -     sulfur hexafluoride microsphr (Lumason) injection 24.28 mg  -     perflutren protein A microsphere (Optison) injection 0.5 mL  -     Insert and maintain peripheral IV; Standing  -     losartan (Cozaar) 100 mg tablet; Take 1 tablet (100 mg) by mouth once daily.  -     spironolactone (Aldactone) 25 mg tablet; Take 1 tablet (25 mg) by mouth once daily.  -     Basic Metabolic Panel; Future  -     Follow Up In Primary Care; Future  -     Comprehensive Metabolic Panel; Future  Mixed hyperlipidemia  Primary hypertension  -     losartan (Cozaar) 100 mg tablet; Take 1 tablet (100 mg) by  mouth once daily.  -     Basic Metabolic Panel; Future  -     Follow Up In Primary Care; Future  -     Comprehensive Metabolic Panel; Future  Hyperglycemia    Current Outpatient Medications   Medication Instructions    acetaminophen (TYLENOL EXTRA STRENGTH) 500-1,000 mg, oral, 3 times daily PRN    aspirin 81 mg, oral, Daily    atorvastatin (LIPITOR) 80 mg, oral, Daily    carvedilol (COREG) 25 mg, oral, 2 times daily    diclofenac sodium (VOLTAREN) 4 g, Topical, 3 times daily PRN    doxazosin (CARDURA) 8 mg, oral, Nightly    famotidine (PEPCID) 20 mg, oral, Daily    flaxseed oiL 1,000 mg capsule TAKE AS DIRECTED.    furosemide (LASIX) 40 mg, oral, Daily    ibuprofen 200-400 mg, oral, 3 times daily PRN    losartan (COZAAR) 100 mg, oral, Daily    metFORMIN XR (GLUCOPHAGE-XR) 500 mg, oral, Daily with evening meal    Mounjaro 7.5 mg, subcutaneous, Weekly    multivit-min/ferrous fumarate (MULTI VITAMIN ORAL) as directed Orally    nitroglycerin (NITROSTAT) 0.4 mg, sublingual, Every 5 min PRN    spironolactone (ALDACTONE) 25 mg, oral, Daily     Subjective   - The patient otherwise feels well and denies any acute symptoms or concerns at this time.  - The patient denies any changes or progression of their chronic medical problems.  - The patient denies any problems or concerns with their medications.      Review of Systems   Constitutional: Negative.    HENT: Negative.     Eyes: Negative.    Respiratory: Negative.     Cardiovascular: Negative.    Gastrointestinal: Negative.    Endocrine: Negative.    Genitourinary: Negative.    Musculoskeletal: Negative.    Skin: Negative.    Allergic/Immunologic: Negative.    Neurological: Negative.    Hematological: Negative.    Psychiatric/Behavioral: Negative.     All other systems reviewed and are negative.    Objective   Vitals:    04/09/25 1129   BP: 128/70   Pulse: 74   SpO2: 96%      Body mass index is 36.2 kg/m².  Physical Exam  Vitals and nursing note reviewed.   Constitutional:        General: He is not in acute distress.     Appearance: Normal appearance. He is not ill-appearing.   HENT:      Head: Normocephalic and atraumatic.      Right Ear: Tympanic membrane, ear canal and external ear normal. There is no impacted cerumen.      Left Ear: Tympanic membrane, ear canal and external ear normal. There is no impacted cerumen.      Nose: Nose normal.      Mouth/Throat:      Mouth: Mucous membranes are moist.      Pharynx: Oropharynx is clear. No oropharyngeal exudate or posterior oropharyngeal erythema.   Eyes:      General: No scleral icterus.        Right eye: No discharge.         Left eye: No discharge.      Extraocular Movements: Extraocular movements intact.      Conjunctiva/sclera: Conjunctivae normal.      Pupils: Pupils are equal, round, and reactive to light.   Neck:      Vascular: No carotid bruit.   Cardiovascular:      Rate and Rhythm: Normal rate and regular rhythm.      Pulses: Normal pulses.      Heart sounds: Normal heart sounds. No murmur heard.     No friction rub. No gallop.   Pulmonary:      Effort: Pulmonary effort is normal. No respiratory distress.      Breath sounds: Normal breath sounds.   Abdominal:      General: Abdomen is flat. Bowel sounds are normal.      Palpations: Abdomen is soft.      Tenderness: There is no abdominal tenderness.      Hernia: No hernia is present.   Musculoskeletal:         General: No swelling. Normal range of motion.      Cervical back: Normal range of motion.   Lymphadenopathy:      Cervical: No cervical adenopathy.   Skin:     General: Skin is warm and dry.      Coloration: Skin is not jaundiced.      Findings: No rash.   Neurological:      General: No focal deficit present.      Mental Status: He is alert and oriented to person, place, and time. Mental status is at baseline.   Psychiatric:         Mood and Affect: Mood normal.         Behavior: Behavior normal.       Diagnostic Results   Lab Results   Component Value Date    GLUCOSE 97  "04/07/2025    CALCIUM 9.1 04/07/2025     04/07/2025    K 3.2 (L) 04/07/2025    CO2 28 04/07/2025     04/07/2025    BUN 16 04/07/2025    CREATININE 0.98 04/07/2025     Lab Results   Component Value Date    ALT 38 04/07/2025    AST 28 04/07/2025    ALKPHOS 81 04/07/2025    BILITOT 0.8 04/07/2025     Lab Results   Component Value Date    WBC 6.4 04/07/2025    HGB 13.1 (L) 04/07/2025    HCT 40.6 04/07/2025    MCV 87.9 04/07/2025     (L) 04/07/2025     Lab Results   Component Value Date    CHOL 99 04/07/2025    CHOL 112 (L) 03/18/2024    CHOL 93 (L) 09/11/2023     Lab Results   Component Value Date    HDL 33 (L) 04/07/2025    HDL 37.0 (L) 03/18/2024    HDL 32 (L) 09/11/2023     Lab Results   Component Value Date    LDLCALC 44 04/07/2025    LDLCALC 44 (L) 03/18/2024    LDLCALC 42 (L) 09/11/2023     Lab Results   Component Value Date    TRIG 137 04/07/2025    TRIG 157 (H) 03/18/2024    TRIG 94 09/11/2023     No components found for: \"CHOLHDL\"  Lab Results   Component Value Date    HGBA1C 5.9 (H) 04/07/2025     Other labs not included in the list above reviewed either before or during this encounter.    History   Past Medical History:   Diagnosis Date    Arrhythmia     Arthritis     Coronary artery disease     Heart disease     Hyperlipidemia     Hypertension     Myocardial infarction (Multi)     Presence of cardiac pacemaker 03/01/2016    History of cardiac pacemaker    Sleep apnea     Thrombocytopenia (CMS-HCC)     Type 2 diabetes mellitus      Past Surgical History:   Procedure Laterality Date    CARDIAC CATHETERIZATION      CHOLECYSTECTOMY  10/20/2014    Cholecystectomy    COLONOSCOPY  09/26/2024    CORONARY ARTERY BYPASS GRAFT  10/20/2014    CABG    INSERT / REPLACE / REMOVE PACEMAKER      OTHER SURGICAL HISTORY  01/05/2022    Permanent pacemaker insertion     Family History   Problem Relation Name Age of Onset    Other (CVA) Father      Stroke Father      Esophageal cancer Brother       Social " History     Socioeconomic History    Marital status: Single     Spouse name: Not on file    Number of children: 0    Years of education: Not on file    Highest education level: Not on file   Occupational History    Occupation: PART TIME   Tobacco Use    Smoking status: Never     Passive exposure: Never    Smokeless tobacco: Never   Vaping Use    Vaping status: Never Used   Substance and Sexual Activity    Alcohol use: Yes     Comment: SOCIALLY    Drug use: Not Currently    Sexual activity: Defer   Other Topics Concern    Not on file   Social History Narrative    Not on file     Social Drivers of Health     Financial Resource Strain: Not on file   Food Insecurity: Not on file   Transportation Needs: Not on file   Physical Activity: Not on file   Stress: Not on file   Social Connections: Not on file   Intimate Partner Violence: Not on file   Housing Stability: Not on file     No Known Allergies    Current Outpatient Medications on File Prior to Visit   Medication Sig Dispense Refill    aspirin 81 mg EC tablet Take 1 tablet (81 mg) by mouth once daily.      atorvastatin (Lipitor) 80 mg tablet Take 1 tablet (80 mg) by mouth once daily. 90 tablet 3    carvedilol (Coreg) 25 mg tablet Take 1 tablet (25 mg) by mouth 2 times a day. 180 tablet 3    doxazosin (Cardura) 8 mg tablet Take 1 tablet (8 mg) by mouth once daily at bedtime. 90 tablet 3    famotidine (Pepcid) 20 mg tablet Take 1 tablet (20 mg) by mouth once daily. 90 tablet 3    flaxseed oiL 1,000 mg capsule TAKE AS DIRECTED.      furosemide (Lasix) 40 mg tablet Take 1 tablet (40 mg) by mouth once daily.      metFORMIN  mg 24 hr tablet Take 1 tablet (500 mg) by mouth once daily in the evening. Take with meals. 90 tablet 3    multivit-min/ferrous fumarate (MULTI VITAMIN ORAL) as directed Orally      nitroglycerin (Nitrostat) 0.4 mg SL tablet Place 1 tablet (0.4 mg) under the tongue every 5 minutes if needed for chest pain. 30 tablet 0    [DISCONTINUED] amLODIPine  (Norvasc) 5 mg tablet Take 1 tablet (5 mg) by mouth once daily. 90 tablet 3    [DISCONTINUED] losartan-hydrochlorothiazide (Hyzaar) 100-25 mg tablet Take 1 tablet by mouth once daily. 90 tablet 3    [DISCONTINUED] potassium gluconate 595 mg (99 mg) tablet Take 3 tablets by mouth once daily.      [DISCONTINUED] tirzepatide (Mounjaro) 5 mg/0.5 mL pen injector Inject 5 mg under the skin 1 (one) time per week. 6 mL 3    [DISCONTINUED] metFORMIN  mg 24 hr tablet Take 1 tablet (500 mg) by mouth once daily in the evening. Take with meals. 90 tablet 3     No current facility-administered medications on file prior to visit.     Immunization History   Administered Date(s) Administered    Flu vaccine (IIV4), preservative free *Check age/dose* 09/14/2017    Flu vaccine, quadrivalent, high-dose, preservative free, age 65y+ (FLUZONE) 09/30/2020, 12/08/2021, 10/12/2022, 10/03/2023    Flu vaccine, quadrivalent, recombinant, preservative free, adult (FLUBLOK) 09/21/2020    Flu vaccine, trivalent, preservative free, HIGH-DOSE, age 65y+ (Fluzone) 12/08/2021, 10/12/2022, 10/02/2024    Influenza, injectable, MDCK, quadrivalent 10/15/2019    Influenza, injectable, quadrivalent 10/06/2016, 10/22/2018, 10/01/2019    Influenza, seasonal, injectable 10/13/2010, 12/17/2011, 12/15/2012, 01/18/2014, 09/22/2014    Moderna SARS-CoV-2 Vaccination 04/16/2021, 05/13/2021    Pneumococcal conjugate vaccine, 13-valent (PREVNAR 13) 09/19/2016    Pneumococcal conjugate vaccine, 20-valent (PREVNAR 20) 03/20/2024    Pneumococcal polysaccharide vaccine, 23-valent, age 2 years and older (PNEUMOVAX 23) 10/01/2004    Td vaccine, age 7 years and older (TDVAX) 05/01/2006    Tdap vaccine, age 7 year and older (BOOSTRIX, ADACEL) 09/19/2016     Patient's medical history was reviewed and updated either before or during this encounter.     Satish Sandoval MD

## 2025-04-09 NOTE — PATIENT INSTRUCTIONS
- Overall, the patient feels well and denies any acute symptoms / concerns at this time.  - Blood pressure at goal today.  - Encouraged continued dietary, exercise, and lifestyle modification.  - Encouraged continued use of CPAP.  - Significant medication and problem list reconciliation done today.   - Discussed with the patient that we can increase the dosage of the mounjaro. This would not be for his sugars, but rather to further assist with weight loss.    Discussed routine and/or preventative care with the patient as outlined below:  - Labwork:   - Patient had labwork done for this appointment. Discussed today.   - Patient's potassium remains low despite significant potassium supplementation. I suspect that we are doing this to him with a combination of the hydrochlorothiazide and the furosemide. Discussed with the patient that I think that his blood pressure regimen could be optimized so that he is taking significantly less medications.   - Will get rid of the losartan-hydrochlorothiazide combination pill and just keep the losartan.  - Continue the carvedilol.  - For now, will eliminate both the hydrochlorothiazide and the amlodipine and attempt to replace them with spironolactone. If we are doing this, then I suspect that we can also get rid of the potassium supplementation.   - Will recheck blood pressure with the patient in a couple of weeks and will have a repeat BMP done at that time.  - The remainder of the patient's labwork looked great.  - Will order labwork for the patient's next appointment. Encouraged the patient to get this labwork done one week prior to the next appointment.  - Imaging:   - Colorectal Cancer: Patient had this done last year, is no longer recommended due to age.  - Cardiovascular Imaging: It has been several years since we have done an echocardiogram.  - Encouraged the patient to continue to get their routine annual diabetic retinal exam.  - Immunizations:   - Encouraged the patient  to get their shingles (shingrix) immunizations.  - Discussed the RSV immunization.     ADVANCED CARE PLANNING  Advanced Care Planning was discussed with patient.  Encouraged the patient to confirm that Living Will and Healthcare Power of  (HCPoA) are accurate and up to date.  Encouraged the patient to confirm that our office be provided a copy of any documentation in the event that anything changes.

## 2025-04-17 ENCOUNTER — HOSPITAL ENCOUNTER (OUTPATIENT)
Dept: CARDIOLOGY | Facility: HOSPITAL | Age: 70
Discharge: HOME | End: 2025-04-17
Payer: MEDICARE

## 2025-04-17 DIAGNOSIS — I50.32 CHRONIC DIASTOLIC HEART FAILURE: ICD-10-CM

## 2025-04-17 DIAGNOSIS — Z95.810 ICD (IMPLANTABLE CARDIOVERTER-DEFIBRILLATOR) IN PLACE: ICD-10-CM

## 2025-04-17 DIAGNOSIS — I25.10 CORONARY ARTERY DISEASE INVOLVING NATIVE CORONARY ARTERY OF NATIVE HEART WITHOUT ANGINA PECTORIS: ICD-10-CM

## 2025-04-17 LAB
AORTIC VALVE MEAN GRADIENT: 2 MMHG
AORTIC VALVE PEAK VELOCITY: 1.12 M/S
AV PEAK GRADIENT: 5 MMHG
EJECTION FRACTION APICAL 4 CHAMBER: 39.2
EJECTION FRACTION: 38 %
LEFT ATRIUM VOLUME AREA LENGTH INDEX BSA: 19.4 ML/M2
MITRAL VALVE E/A RATIO: 0.91
RIGHT VENTRICLE FREE WALL PEAK S': 7.18 CM/S
TRICUSPID ANNULAR PLANE SYSTOLIC EXCURSION: 1.5 CM

## 2025-04-17 PROCEDURE — 93306 TTE W/DOPPLER COMPLETE: CPT | Performed by: INTERNAL MEDICINE

## 2025-04-17 PROCEDURE — 93306 TTE W/DOPPLER COMPLETE: CPT

## 2025-04-17 PROCEDURE — 2500000004 HC RX 250 GENERAL PHARMACY W/ HCPCS (ALT 636 FOR OP/ED): Performed by: STUDENT IN AN ORGANIZED HEALTH CARE EDUCATION/TRAINING PROGRAM

## 2025-04-17 RX ADMIN — PERFLUTREN 2 ML OF DILUTION: 6.52 INJECTION, SUSPENSION INTRAVENOUS at 11:17

## 2025-04-18 DIAGNOSIS — K21.9 GASTROESOPHAGEAL REFLUX DISEASE WITHOUT ESOPHAGITIS: ICD-10-CM

## 2025-04-18 RX ORDER — FAMOTIDINE 20 MG/1
20 TABLET, FILM COATED ORAL DAILY
Qty: 90 TABLET | Refills: 0 | Status: SHIPPED | OUTPATIENT
Start: 2025-04-18

## 2025-04-23 ENCOUNTER — CLINICAL SUPPORT (OUTPATIENT)
Dept: PRIMARY CARE | Facility: CLINIC | Age: 70
End: 2025-04-23
Payer: MEDICARE

## 2025-04-23 VITALS — OXYGEN SATURATION: 97 % | SYSTOLIC BLOOD PRESSURE: 156 MMHG | DIASTOLIC BLOOD PRESSURE: 84 MMHG | HEART RATE: 66 BPM

## 2025-04-23 DIAGNOSIS — I50.32 CHRONIC DIASTOLIC HEART FAILURE: ICD-10-CM

## 2025-04-23 DIAGNOSIS — I25.10 CORONARY ARTERY DISEASE INVOLVING NATIVE CORONARY ARTERY OF NATIVE HEART WITHOUT ANGINA PECTORIS: ICD-10-CM

## 2025-04-23 DIAGNOSIS — I10 PRIMARY HYPERTENSION: ICD-10-CM

## 2025-04-23 RX ORDER — AMLODIPINE BESYLATE 2.5 MG/1
2.5 TABLET ORAL DAILY
Qty: 30 TABLET | Refills: 0 | Status: SHIPPED | OUTPATIENT
Start: 2025-04-23 | End: 2025-05-23

## 2025-04-23 NOTE — PROGRESS NOTES
Patient having trouble with potassium. Attempted to discontinue hydrochlorothiazide (and, subsequently, the potassium supplementation) by swapping this over to spironolactone. At that time, I also attempted to discontinue the patient's amlodipine.    Blood pressure elevated today. Patient already on maximal carvedilol and losartan while being on reasonable doses of both the spironolactone and furosemide. Patient with very mild pitting edema bilaterally. Will restart low-dose amlodipine. I do suspect that this can come off again if we can successfully use the mounjaro to get the patient's weight down some more. Will not make other changes at this time. Recheck in 2 weeks.

## 2025-04-23 NOTE — PATIENT INSTRUCTIONS
Patient having trouble with potassium. Attempted to discontinue hydrochlorothiazide (and, subsequently, the potassium supplementation) by swapping this over to spironolactone. At that time, I also attempted to discontinue the patient's amlodipine.    Blood pressure elevated today. Patient already on maximal carvedilol and losartan while being on reasonable doses of both the spironolactone and furosemide. Patient with very mild pitting edema bilaterally. Will restart low-dose amlodipine. I do suspect that this can come off again if we can successfully use the mounjaro to get the patient's weight down some more.

## 2025-04-24 LAB
ANION GAP SERPL CALCULATED.4IONS-SCNC: 9 MMOL/L (CALC) (ref 7–17)
BUN SERPL-MCNC: 22 MG/DL (ref 7–25)
BUN/CREAT SERPL: NORMAL (CALC) (ref 6–22)
CALCIUM SERPL-MCNC: 9.3 MG/DL (ref 8.6–10.3)
CHLORIDE SERPL-SCNC: 105 MMOL/L (ref 98–110)
CO2 SERPL-SCNC: 28 MMOL/L (ref 20–32)
CREAT SERPL-MCNC: 0.97 MG/DL (ref 0.7–1.35)
EGFRCR SERPLBLD CKD-EPI 2021: 85 ML/MIN/1.73M2
GLUCOSE SERPL-MCNC: 95 MG/DL (ref 65–139)
POTASSIUM SERPL-SCNC: 4.2 MMOL/L (ref 3.5–5.3)
SODIUM SERPL-SCNC: 142 MMOL/L (ref 135–146)

## 2025-05-02 DIAGNOSIS — I25.10 CORONARY ARTERY DISEASE INVOLVING NATIVE CORONARY ARTERY OF NATIVE HEART WITHOUT ANGINA PECTORIS: ICD-10-CM

## 2025-05-02 DIAGNOSIS — E78.2 MIXED HYPERLIPIDEMIA: ICD-10-CM

## 2025-05-02 RX ORDER — ATORVASTATIN CALCIUM 80 MG/1
80 TABLET, FILM COATED ORAL DAILY
Qty: 90 TABLET | Refills: 3 | Status: SHIPPED | OUTPATIENT
Start: 2025-05-02 | End: 2026-05-02

## 2025-05-06 ENCOUNTER — OFFICE VISIT (OUTPATIENT)
Dept: PRIMARY CARE | Facility: CLINIC | Age: 70
End: 2025-05-06
Payer: MEDICARE

## 2025-05-06 VITALS
HEIGHT: 68 IN | SYSTOLIC BLOOD PRESSURE: 128 MMHG | DIASTOLIC BLOOD PRESSURE: 80 MMHG | WEIGHT: 236 LBS | BODY MASS INDEX: 35.77 KG/M2 | HEART RATE: 75 BPM | OXYGEN SATURATION: 96 %

## 2025-05-06 DIAGNOSIS — E11.9 TYPE 2 DIABETES MELLITUS WITHOUT COMPLICATION, WITHOUT LONG-TERM CURRENT USE OF INSULIN: ICD-10-CM

## 2025-05-06 DIAGNOSIS — I25.10 CORONARY ARTERY DISEASE INVOLVING NATIVE CORONARY ARTERY OF NATIVE HEART WITHOUT ANGINA PECTORIS: Primary | ICD-10-CM

## 2025-05-06 DIAGNOSIS — I50.32 CHRONIC DIASTOLIC HEART FAILURE: ICD-10-CM

## 2025-05-06 DIAGNOSIS — I10 PRIMARY HYPERTENSION: ICD-10-CM

## 2025-05-06 DIAGNOSIS — Z95.810 ICD (IMPLANTABLE CARDIOVERTER-DEFIBRILLATOR) IN PLACE: ICD-10-CM

## 2025-05-06 DIAGNOSIS — G47.33 OSA (OBSTRUCTIVE SLEEP APNEA): ICD-10-CM

## 2025-05-06 PROCEDURE — 3008F BODY MASS INDEX DOCD: CPT | Performed by: STUDENT IN AN ORGANIZED HEALTH CARE EDUCATION/TRAINING PROGRAM

## 2025-05-06 PROCEDURE — 3074F SYST BP LT 130 MM HG: CPT | Performed by: STUDENT IN AN ORGANIZED HEALTH CARE EDUCATION/TRAINING PROGRAM

## 2025-05-06 PROCEDURE — 1036F TOBACCO NON-USER: CPT | Performed by: STUDENT IN AN ORGANIZED HEALTH CARE EDUCATION/TRAINING PROGRAM

## 2025-05-06 PROCEDURE — 4010F ACE/ARB THERAPY RXD/TAKEN: CPT | Performed by: STUDENT IN AN ORGANIZED HEALTH CARE EDUCATION/TRAINING PROGRAM

## 2025-05-06 PROCEDURE — 1160F RVW MEDS BY RX/DR IN RCRD: CPT | Performed by: STUDENT IN AN ORGANIZED HEALTH CARE EDUCATION/TRAINING PROGRAM

## 2025-05-06 PROCEDURE — 1126F AMNT PAIN NOTED NONE PRSNT: CPT | Performed by: STUDENT IN AN ORGANIZED HEALTH CARE EDUCATION/TRAINING PROGRAM

## 2025-05-06 PROCEDURE — 99214 OFFICE O/P EST MOD 30 MIN: CPT | Performed by: STUDENT IN AN ORGANIZED HEALTH CARE EDUCATION/TRAINING PROGRAM

## 2025-05-06 PROCEDURE — G2211 COMPLEX E/M VISIT ADD ON: HCPCS | Performed by: STUDENT IN AN ORGANIZED HEALTH CARE EDUCATION/TRAINING PROGRAM

## 2025-05-06 PROCEDURE — 1159F MED LIST DOCD IN RCRD: CPT | Performed by: STUDENT IN AN ORGANIZED HEALTH CARE EDUCATION/TRAINING PROGRAM

## 2025-05-06 PROCEDURE — 3079F DIAST BP 80-89 MM HG: CPT | Performed by: STUDENT IN AN ORGANIZED HEALTH CARE EDUCATION/TRAINING PROGRAM

## 2025-05-06 ASSESSMENT — PATIENT HEALTH QUESTIONNAIRE - PHQ9
SUM OF ALL RESPONSES TO PHQ9 QUESTIONS 1 AND 2: 0
2. FEELING DOWN, DEPRESSED OR HOPELESS: NOT AT ALL
1. LITTLE INTEREST OR PLEASURE IN DOING THINGS: NOT AT ALL

## 2025-05-06 ASSESSMENT — PAIN SCALES - GENERAL: PAINLEVEL_OUTOF10: 0-NO PAIN

## 2025-05-06 NOTE — PROGRESS NOTES
[facility]: [facility]  PROGRESS NOTE      Homero Brasher is a 69 y.o. male that is presenting today for Follow-up.    Assessment/Plan   - This is a follow-up on a follow-up appointment. We have been attempting to adwoa with the patient's blood pressure regimen because he was having significant issues with his potassium.  - Blood pressure at goal today. Patient's swelling is mild and very reasonable; it is not causing the patient discomfort. At this time, I think that we are done tinkering unless there is a problem in the future.  - Encouraged continued dietary, exercise, and lifestyle modification.  - Encouraged continued use of CPAP.  - Significant medication and problem list reconciliation done today.     - Labwork:   - Patient did not require labwork for this appointment. Labwork done for the visit 04/23/2025 showing improvement in the potassium after stopping both the thiazide diuretic as well as all of the potassium supplementation.  - Patient has already had labs ordered for their next appointment. Encouraged the patient to get this labwork done one week prior to the next appointment.    Diagnoses and all orders for this visit:  Coronary artery disease involving native coronary artery of native heart without angina pectoris  Type 2 diabetes mellitus without complication, without long-term current use of insulin  ICD (implantable cardioverter-defibrillator) in place  HAYDEN (obstructive sleep apnea)  Chronic diastolic heart failure  Primary hypertension    Current Outpatient Medications   Medication Instructions    acetaminophen (TYLENOL EXTRA STRENGTH) 500-1,000 mg, oral, 3 times daily PRN    amLODIPine (NORVASC) 2.5 mg, oral, Daily    aspirin 81 mg, oral, Daily    atorvastatin (LIPITOR) 80 mg, oral, Daily    carvedilol (COREG) 25 mg, oral, 2 times daily    diclofenac sodium (VOLTAREN) 4 g, Topical, 3 times daily PRN    doxazosin (CARDURA) 8 mg, oral, Nightly    famotidine (PEPCID) 20  "mg, oral, Daily    flaxseed oiL 1,000 mg capsule TAKE AS DIRECTED.    furosemide (LASIX) 40 mg, oral, Daily    ibuprofen 200-400 mg, oral, 3 times daily PRN    losartan (COZAAR) 100 mg, oral, Daily    metFORMIN XR (GLUCOPHAGE-XR) 500 mg, oral, Daily with evening meal    Mounjaro 7.5 mg, subcutaneous, Weekly    multivit-min/ferrous fumarate (MULTI VITAMIN ORAL) as directed Orally    nitroglycerin (NITROSTAT) 0.4 mg, sublingual, Every 5 min PRN    spironolactone (ALDACTONE) 25 mg, oral, Daily     Subjective   HPI  Review of Systems   Objective   Vitals:    05/06/25 1023   BP: 128/80   Pulse: 75   SpO2: 96%      Body mass index is 35.89 kg/m².  Physical Exam  Diagnostic Results   Lab Results   Component Value Date    GLUCOSE 95 04/23/2025    CALCIUM 9.3 04/23/2025     04/23/2025    K 4.2 04/23/2025    CO2 28 04/23/2025     04/23/2025    BUN 22 04/23/2025    CREATININE 0.97 04/23/2025     Lab Results   Component Value Date    ALT 38 04/07/2025    AST 28 04/07/2025    ALKPHOS 81 04/07/2025    BILITOT 0.8 04/07/2025     Lab Results   Component Value Date    WBC 6.4 04/07/2025    HGB 13.1 (L) 04/07/2025    HCT 40.6 04/07/2025    MCV 87.9 04/07/2025     (L) 04/07/2025     Lab Results   Component Value Date    CHOL 99 04/07/2025    CHOL 112 (L) 03/18/2024    CHOL 93 (L) 09/11/2023     Lab Results   Component Value Date    HDL 33 (L) 04/07/2025    HDL 37.0 (L) 03/18/2024    HDL 32 (L) 09/11/2023     Lab Results   Component Value Date    LDLCALC 44 04/07/2025    LDLCALC 44 (L) 03/18/2024    LDLCALC 42 (L) 09/11/2023     Lab Results   Component Value Date    TRIG 137 04/07/2025    TRIG 157 (H) 03/18/2024    TRIG 94 09/11/2023     No components found for: \"CHOLHDL\"  Lab Results   Component Value Date    HGBA1C 5.9 (H) 04/07/2025     Other labs not included in the list above were reviewed either before or during this encounter.    History    Medical History[1]  Surgical History[2]  Family History[3]  Social " History     Socioeconomic History    Marital status: Single     Spouse name: Not on file    Number of children: 0    Years of education: Not on file    Highest education level: Not on file   Occupational History    Occupation: PART TIME   Tobacco Use    Smoking status: Never     Passive exposure: Never    Smokeless tobacco: Never   Vaping Use    Vaping status: Never Used   Substance and Sexual Activity    Alcohol use: Yes     Comment: SOCIALLY    Drug use: Not Currently    Sexual activity: Defer   Other Topics Concern    Not on file   Social History Narrative    Not on file     Social Drivers of Health     Financial Resource Strain: Not on file   Food Insecurity: Not on file   Transportation Needs: Not on file   Physical Activity: Not on file   Stress: Not on file   Social Connections: Not on file   Intimate Partner Violence: Not on file   Housing Stability: Not on file     Allergies[4]  Medications Ordered Prior to Encounter[5]  Immunization History   Administered Date(s) Administered    Flu vaccine (IIV4), preservative free *Check age/dose* 09/14/2017    Flu vaccine, quadrivalent, high-dose, preservative free, age 65y+ (FLUZONE) 09/30/2020, 12/08/2021, 10/12/2022, 10/03/2023    Flu vaccine, quadrivalent, recombinant, preservative free, adult (FLUBLOK) 09/21/2020    Flu vaccine, trivalent, preservative free, HIGH-DOSE, age 65y+ (Fluzone) 12/08/2021, 10/12/2022, 10/02/2024    Influenza, injectable, MDCK, quadrivalent 10/15/2019    Influenza, injectable, quadrivalent 10/06/2016, 10/22/2018, 10/01/2019    Influenza, seasonal, injectable 10/13/2010, 12/17/2011, 12/15/2012, 01/18/2014, 09/22/2014    Moderna SARS-CoV-2 Vaccination 04/16/2021, 05/13/2021    Pneumococcal conjugate vaccine, 13-valent (PREVNAR 13) 09/19/2016    Pneumococcal conjugate vaccine, 20-valent (PREVNAR 20) 03/20/2024    Pneumococcal polysaccharide vaccine, 23-valent, age 2 years and older (PNEUMOVAX 23) 10/01/2004    Td vaccine, age 7 years and  older (TDVAX) 05/01/2006    Tdap vaccine, age 7 year and older (BOOSTRIX, ADACEL) 09/19/2016     Patient's medical history was reviewed and updated either before or during this encounter.       Satish Sandoval MD         [1]   Past Medical History:  Diagnosis Date    Arrhythmia     Arthritis     Coronary artery disease     Heart disease     Hyperlipidemia     Hypertension     Myocardial infarction (Multi)     Presence of cardiac pacemaker 03/01/2016    History of cardiac pacemaker    Sleep apnea     Thrombocytopenia (CMS-HCC)     Type 2 diabetes mellitus    [2]   Past Surgical History:  Procedure Laterality Date    CARDIAC CATHETERIZATION      CHOLECYSTECTOMY  10/20/2014    Cholecystectomy    COLONOSCOPY  09/26/2024    CORONARY ARTERY BYPASS GRAFT  10/20/2014    CABG    INSERT / REPLACE / REMOVE PACEMAKER      OTHER SURGICAL HISTORY  01/05/2022    Permanent pacemaker insertion   [3]   Family History  Problem Relation Name Age of Onset    Other (CVA) Father      Stroke Father      Esophageal cancer Brother     [4] No Known Allergies  [5]   Current Outpatient Medications on File Prior to Visit   Medication Sig Dispense Refill    acetaminophen (Tylenol Extra Strength) 500 mg tablet Take 1-2 tablets (500-1,000 mg) by mouth 3 times a day as needed for mild pain (1 - 3), moderate pain (4 - 6) or fever (temp greater than 38.0 C).      amLODIPine (Norvasc) 2.5 mg tablet Take 1 tablet (2.5 mg) by mouth once daily. 30 tablet 0    aspirin 81 mg EC tablet Take 1 tablet (81 mg) by mouth once daily.      atorvastatin (Lipitor) 80 mg tablet Take 1 tablet (80 mg) by mouth once daily. 90 tablet 3    carvedilol (Coreg) 25 mg tablet Take 1 tablet (25 mg) by mouth 2 times a day. 180 tablet 3    diclofenac sodium (Voltaren) 1 % gel Apply 4.5 inches (4 g) topically 3 times a day as needed (pain).      doxazosin (Cardura) 8 mg tablet Take 1 tablet (8 mg) by mouth once daily at bedtime. 90 tablet 3    famotidine (Pepcid) 20 mg tablet  TAKE 1 TABLET BY MOUTH EVERY DAY 90 tablet 0    flaxseed oiL 1,000 mg capsule TAKE AS DIRECTED.      furosemide (Lasix) 40 mg tablet Take 1 tablet (40 mg) by mouth once daily.      ibuprofen 200 mg tablet Take 1-2 tablets (200-400 mg) by mouth 3 times a day as needed for mild pain (1 - 3), moderate pain (4 - 6) or headaches.      losartan (Cozaar) 100 mg tablet Take 1 tablet (100 mg) by mouth once daily. 90 tablet 3    metFORMIN  mg 24 hr tablet Take 1 tablet (500 mg) by mouth once daily in the evening. Take with meals. 90 tablet 3    multivit-min/ferrous fumarate (MULTI VITAMIN ORAL) as directed Orally      nitroglycerin (Nitrostat) 0.4 mg SL tablet Place 1 tablet (0.4 mg) under the tongue every 5 minutes if needed for chest pain. 30 tablet 0    spironolactone (Aldactone) 25 mg tablet Take 1 tablet (25 mg) by mouth once daily. 30 tablet 1    tirzepatide (Mounjaro) 7.5 mg/0.5 mL pen injector Inject 7.5 mg under the skin 1 (one) time per week.      [DISCONTINUED] atorvastatin (Lipitor) 80 mg tablet Take 1 tablet (80 mg) by mouth once daily. 90 tablet 3     No current facility-administered medications on file prior to visit.

## 2025-05-06 NOTE — PATIENT INSTRUCTIONS
- This is a follow-up on a follow-up appointment. We have been attempting to adwoa with the patient's blood pressure regimen because he was having significant issues with his potassium.  - Blood pressure at goal today. Patient's swelling is mild and very reasonable; it is not causing the patient discomfort. At this time, I think that we are done tinkering unless there is a problem in the future.  - Encouraged continued dietary, exercise, and lifestyle modification.  - Encouraged continued use of CPAP.  - Significant medication and problem list reconciliation done today.     - Labwork:   - Patient did not require labwork for this appointment. Labwork done for the visit 04/23/2025 showing improvement in the potassium after stopping both the thiazide diuretic as well as all of the potassium supplementation.  - Patient has already had labs ordered for their next appointment. Encouraged the patient to get this labwork done one week prior to the next appointment.

## 2025-05-14 ENCOUNTER — HOSPITAL ENCOUNTER (OUTPATIENT)
Dept: CARDIOLOGY | Facility: CLINIC | Age: 70
Discharge: HOME | End: 2025-05-14
Payer: MEDICARE

## 2025-05-14 DIAGNOSIS — I49.01 VF (VENTRICULAR FIBRILLATION) (MULTI): ICD-10-CM

## 2025-05-14 PROCEDURE — 93284 PRGRMG EVAL IMPLANTABLE DFB: CPT

## 2025-05-14 PROCEDURE — 93284 PRGRMG EVAL IMPLANTABLE DFB: CPT | Performed by: INTERNAL MEDICINE

## 2025-05-28 DIAGNOSIS — I50.32 CHRONIC DIASTOLIC HEART FAILURE: ICD-10-CM

## 2025-05-28 DIAGNOSIS — I10 PRIMARY HYPERTENSION: ICD-10-CM

## 2025-05-28 DIAGNOSIS — I25.10 CORONARY ARTERY DISEASE INVOLVING NATIVE CORONARY ARTERY OF NATIVE HEART WITHOUT ANGINA PECTORIS: ICD-10-CM

## 2025-05-28 RX ORDER — AMLODIPINE BESYLATE 2.5 MG/1
2.5 TABLET ORAL DAILY
Qty: 90 TABLET | Refills: 1 | Status: SHIPPED | OUTPATIENT
Start: 2025-05-28

## 2025-06-17 DIAGNOSIS — I50.32 CHRONIC DIASTOLIC HEART FAILURE: ICD-10-CM

## 2025-06-17 RX ORDER — SPIRONOLACTONE 25 MG/1
25 TABLET ORAL DAILY
Qty: 30 TABLET | Refills: 1 | Status: SHIPPED | OUTPATIENT
Start: 2025-06-17 | End: 2025-08-16

## 2025-06-24 DIAGNOSIS — I49.01 VF (VENTRICULAR FIBRILLATION) (MULTI): Primary | ICD-10-CM

## 2025-06-25 ENCOUNTER — HOSPITAL ENCOUNTER (OUTPATIENT)
Dept: CARDIOLOGY | Facility: CLINIC | Age: 70
Discharge: HOME | End: 2025-06-25
Payer: MEDICARE

## 2025-06-25 DIAGNOSIS — I47.20 VENTRICULAR TACHYCARDIA, UNSPECIFIED (MULTI): Primary | ICD-10-CM

## 2025-06-25 DIAGNOSIS — I49.01 VF (VENTRICULAR FIBRILLATION) (MULTI): ICD-10-CM

## 2025-06-25 DIAGNOSIS — I49.01 VF (VENTRICULAR FIBRILLATION) (MULTI): Primary | ICD-10-CM

## 2025-07-02 DIAGNOSIS — I25.10 CORONARY ARTERY DISEASE INVOLVING NATIVE CORONARY ARTERY OF NATIVE HEART WITHOUT ANGINA PECTORIS: ICD-10-CM

## 2025-07-02 DIAGNOSIS — I10 PRIMARY HYPERTENSION: ICD-10-CM

## 2025-07-02 DIAGNOSIS — I50.32 CHRONIC DIASTOLIC HEART FAILURE: ICD-10-CM

## 2025-07-02 RX ORDER — CARVEDILOL 25 MG/1
25 TABLET ORAL 2 TIMES DAILY
Qty: 180 TABLET | Refills: 3 | Status: SHIPPED | OUTPATIENT
Start: 2025-07-02 | End: 2026-07-02

## 2025-07-15 DIAGNOSIS — K21.9 GASTROESOPHAGEAL REFLUX DISEASE WITHOUT ESOPHAGITIS: ICD-10-CM

## 2025-07-15 RX ORDER — FAMOTIDINE 20 MG/1
20 TABLET, FILM COATED ORAL DAILY
Qty: 90 TABLET | Refills: 0 | Status: SHIPPED | OUTPATIENT
Start: 2025-07-15

## 2025-07-29 ENCOUNTER — OFFICE VISIT (OUTPATIENT)
Dept: CARDIOLOGY | Facility: CLINIC | Age: 70
End: 2025-07-29
Payer: MEDICARE

## 2025-07-29 VITALS
BODY MASS INDEX: 35.46 KG/M2 | SYSTOLIC BLOOD PRESSURE: 151 MMHG | HEIGHT: 68 IN | DIASTOLIC BLOOD PRESSURE: 85 MMHG | OXYGEN SATURATION: 97 % | HEART RATE: 59 BPM | WEIGHT: 234 LBS

## 2025-07-29 DIAGNOSIS — I42.9 CARDIOMYOPATHY, UNSPECIFIED TYPE (MULTI): Primary | ICD-10-CM

## 2025-07-29 DIAGNOSIS — I10 PRIMARY HYPERTENSION: ICD-10-CM

## 2025-07-29 DIAGNOSIS — Z95.1 HISTORY OF CORONARY ARTERY BYPASS GRAFT: ICD-10-CM

## 2025-07-29 DIAGNOSIS — Z95.810 ICD (IMPLANTABLE CARDIOVERTER-DEFIBRILLATOR) IN PLACE: ICD-10-CM

## 2025-07-29 DIAGNOSIS — Z95.5 HISTORY OF CORONARY ARTERY STENT PLACEMENT: ICD-10-CM

## 2025-07-29 DIAGNOSIS — I25.10 CORONARY ARTERY DISEASE INVOLVING NATIVE CORONARY ARTERY OF NATIVE HEART WITHOUT ANGINA PECTORIS: ICD-10-CM

## 2025-07-29 LAB
ATRIAL RATE: 63 BPM
P AXIS: 93 DEGREES
P OFFSET: 181 MS
P ONSET: 122 MS
PR INTERVAL: 144 MS
Q ONSET: 194 MS
QRS COUNT: 11 BEATS
QRS DURATION: 172 MS
QT INTERVAL: 502 MS
QTC CALCULATION(BAZETT): 513 MS
QTC FREDERICIA: 510 MS
R AXIS: 227 DEGREES
T AXIS: 68 DEGREES
T OFFSET: 445 MS
VENTRICULAR RATE: 63 BPM

## 2025-07-29 PROCEDURE — 3008F BODY MASS INDEX DOCD: CPT | Performed by: INTERNAL MEDICINE

## 2025-07-29 PROCEDURE — 4010F ACE/ARB THERAPY RXD/TAKEN: CPT | Performed by: INTERNAL MEDICINE

## 2025-07-29 PROCEDURE — 1159F MED LIST DOCD IN RCRD: CPT | Performed by: INTERNAL MEDICINE

## 2025-07-29 PROCEDURE — G2211 COMPLEX E/M VISIT ADD ON: HCPCS | Performed by: INTERNAL MEDICINE

## 2025-07-29 PROCEDURE — 99212 OFFICE O/P EST SF 10 MIN: CPT

## 2025-07-29 PROCEDURE — 1036F TOBACCO NON-USER: CPT | Performed by: INTERNAL MEDICINE

## 2025-07-29 PROCEDURE — 99214 OFFICE O/P EST MOD 30 MIN: CPT | Performed by: INTERNAL MEDICINE

## 2025-07-29 PROCEDURE — 3079F DIAST BP 80-89 MM HG: CPT | Performed by: INTERNAL MEDICINE

## 2025-07-29 PROCEDURE — 3077F SYST BP >= 140 MM HG: CPT | Performed by: INTERNAL MEDICINE

## 2025-07-29 PROCEDURE — 93005 ELECTROCARDIOGRAM TRACING: CPT | Performed by: INTERNAL MEDICINE

## 2025-07-29 ASSESSMENT — PATIENT HEALTH QUESTIONNAIRE - PHQ9
SUM OF ALL RESPONSES TO PHQ9 QUESTIONS 1 AND 2: 0
1. LITTLE INTEREST OR PLEASURE IN DOING THINGS: NOT AT ALL
2. FEELING DOWN, DEPRESSED OR HOPELESS: NOT AT ALL

## 2025-07-29 NOTE — PROGRESS NOTES
"Chief Complaint:   No chief complaint on file.     History Of Present Illness:    Homero Brasher is a 69 y.o. male presenting for followup.  -denies chest pain/SOB  -rare dizziness, no worsening LE swelling  -compliant with meds       Last Recorded Vitals:  Vitals:    07/29/25 0803   BP: 151/85   BP Location: Right arm   Patient Position: Sitting   Pulse: 59   SpO2: 97%   Weight: 106 kg (234 lb)   Height: 1.727 m (5' 8\")       Past Medical History:  He has a past medical history of Arrhythmia, Arthritis, Coronary artery disease, Heart disease, Hyperlipidemia, Hypertension, Myocardial infarction (Multi), Presence of cardiac pacemaker (03/01/2016), Sleep apnea, Thrombocytopenia, and Type 2 diabetes mellitus.    He has no past medical history of Renal disease due to hypertension.    Past Surgical History:  He has a past surgical history that includes Cholecystectomy (10/20/2014); Coronary artery bypass graft (10/20/2014); Other surgical history (01/05/2022); Insert / replace / remove pacemaker; Cardiac catheterization; and Colonoscopy (09/26/2024).      Social History:  He reports that he has never smoked. He has never been exposed to tobacco smoke. He has never used smokeless tobacco. He reports current alcohol use. He reports that he does not currently use drugs.    Family History:  Family History[1]     Allergies:  Patient has no known allergies.    Outpatient Medications:  Current Outpatient Medications   Medication Instructions    acetaminophen (TYLENOL EXTRA STRENGTH) 500-1,000 mg, oral, 3 times daily PRN    amLODIPine (NORVASC) 2.5 mg, oral, Daily    aspirin 81 mg, oral, Daily    atorvastatin (LIPITOR) 80 mg, oral, Daily    carvedilol (COREG) 25 mg, oral, 2 times daily    diclofenac sodium (VOLTAREN) 4 g, Topical, 3 times daily PRN    doxazosin (CARDURA) 8 mg, oral, Nightly    famotidine (PEPCID) 20 mg, oral, Daily    flaxseed oiL 1,000 mg capsule TAKE AS DIRECTED.    furosemide (LASIX) 40 mg, oral, Daily    " ibuprofen 200-400 mg, oral, 3 times daily PRN    losartan (COZAAR) 100 mg, oral, Daily    metFORMIN XR (GLUCOPHAGE-XR) 500 mg, oral, Daily with evening meal    Mounjaro 7.5 mg, subcutaneous, Weekly    multivit-min/ferrous fumarate (MULTI VITAMIN ORAL) as directed Orally    nitroglycerin (NITROSTAT) 0.4 mg, sublingual, Every 5 min PRN    spironolactone (ALDACTONE) 25 mg, oral, Daily       Physical Exam:  Constitutional:       Appearance: Healthy appearance. Not in distress.   Neck:      Vascular: No JVR. JVD normal.   Pulmonary:      Effort: Pulmonary effort is normal.      Breath sounds: Normal breath sounds. No wheezing. No rhonchi. No rales.   Chest:      Chest wall: Not tender to palpatation.   Cardiovascular:      Normal rate. Regular rhythm.      Murmurs: There is no murmur.   Edema:     Peripheral edema absent.   Abdominal:      General: Bowel sounds are normal.      Palpations: Abdomen is soft.      Tenderness: There is no abdominal tenderness.   Musculoskeletal: Normal range of motion. Skin:     General: Skin is warm and dry.   Neurological:      General: No focal deficit present.      Mental Status: Alert and oriented to person, place and time.         Last Labs:  CBC -  Lab Results   Component Value Date    WBC 6.4 04/07/2025    HGB 13.1 (L) 04/07/2025    HCT 40.6 04/07/2025    MCV 87.9 04/07/2025     (L) 04/07/2025       CMP -  Lab Results   Component Value Date    CALCIUM 9.3 04/23/2025    PROT 6.7 04/07/2025    ALBUMIN 4.0 04/07/2025    AST 28 04/07/2025    ALT 38 04/07/2025    ALKPHOS 81 04/07/2025    BILITOT 0.8 04/07/2025       LIPID PANEL -   Lab Results   Component Value Date    CHOL 99 04/07/2025    TRIG 137 04/07/2025    HDL 33 (L) 04/07/2025    CHHDL 3.0 04/07/2025    NHDL 66 04/07/2025    LDLCALC 44 04/07/2025       RENAL FUNCTION PANEL -   Lab Results   Component Value Date    GLUCOSE 95 04/23/2025     04/23/2025    K 4.2 04/23/2025     04/23/2025    CO2 28 04/23/2025     ANIONGAP 9 04/23/2025    BUN 22 04/23/2025    CREATININE 0.97 04/23/2025    GFRMALE 63 08/24/2023    CALCIUM 9.3 04/23/2025    ALBUMIN 4.0 04/07/2025        Lab Results   Component Value Date    HGBA1C 5.9 (H) 04/07/2025       Last Cardiology Tests:  ECG independently reviewed from today: sinus rhythm, v-paced, rate 63     Echo 4/15/2025:   1. The left ventricular systolic function is moderately decreased, with a visually estimated ejection fraction of 35-40%.   2. Abnormal wall motion.   3. Abnormal septal motion consistent with RV pacemaker and abnormal septal motion consistent with post-operative status.   4. There is moderate hypokinesis of the inferoposterior wall.      There is mild to moderate hypokinesis of the lateral wall.   5. The left atrial size is mild to moderately dilated.   6. Mild mitral valve regurgitation.     CMR 9/2023:  1. Severely limited exam secondary to susceptibility artifact caused   by pacer. Obtained measurements  demonstrate normal LV size with moderate to severely reduced systolic   function (LVEF =25%). The accuracy of  these numbers may be limited from the artifact and the patient might   benefit from updated echocardiography  as clinically warranted.     2. Subendocardial delayed enhancement involving the basal   anterolateral and inferolateral walls as  described, consistent with prior infarction. The infarct is   essentially transmural in the basal inferior  and inferolateral walls.      3. Dilated main pulmonary artery which can be seen with pulmonary   hypertension.      Cath 9/1/23:  1. Left main: 20-30% distal stenosis.  2. LAD: smaller caliber, 70% proximal disease, 100% mid-vessel stenosis.  3. Ramus: mild irregularities.  4. LCx: very small caliber, 70% ostial stenosis.  5. RCA: no signfiicant disease with patent distal stent, 30% mid-rPLV stenosis.  6. Grafts: (1) LIMA - LAD patent (2) SVG to rPDA occluded (3) SVG to ramus occluded.  7. LVEDP 16mmHg, no aortic stenosis  on LV-Ao gradient.     Echo 3/16/23:  1. Left ventricular systolic function is normal with a 55-60% estimated ejection fraction.  2. Abnormal septal motion consistent with RV pacemaker.  3. Mild to moderate tricuspid regurgitation visualized.  4. There is mild mitral regurgitation.  5. Moderately elevated pulmonary artery pressure, estimated RVSP 52mmHg.        Cath September 2014:  * Left main: 40-50% distal tubular lesion.  * Left Anterior Descending Coronary Artery : Twin LAD system: LAD1:  50-60% proximal stenosis (2mm vessel). LAD2: mild diffuse disease  proximally with 100% mid-vessel occlusion  * Left Circumflex : 95 ostial stenosis.  * Right coronary artery: 90% distal, focal stenosis. 50% PLV stenosis at  angulation.  * Grafts: (1) LIMA to LAD2 patent (2) SVG to OM1 patent, native AV  groove circumflex with moderate diffuse disease (3) SVG to RCA? occluded  proximally  * LVEDP 19mmHg, no significant aortic stenosis on LV-Ao gradient  * Successful PCI to native distal RCA with one zotarolimus drug eluding  stent    Assessment/Plan   Mr. Brasher is a very pleasant 69 year old gentleman with PMH significant for HTN and CAD s/p CABG 2003 at [facility] (LIMA->LAD, SVG->OM1, SVG->RCA) who had sudden cardiac arrest 9/25/2014 (SVG-> RCA graft occluded, PCI to distal native RCA). Has has CRT-D for secondary prevention, s/p VT ablation 9/2023.      CV status stable, NYHA II on exam.      Plan:  -Continue current aspirin, atorvastatin, amlodipine, carvedilol, losartan, spironolactone (has had swelling with higher doses of amlodipine-taken off hydrochlorothiazide and lasix due to hypokalemia {mild)  -going to ICD changeout next month with Dr. Hastings  -Follow-up in 6 months or sooner if needed      Camron Masters MD       [1]   Family History  Problem Relation Name Age of Onset    Other (CVA) Father      Stroke Father      Esophageal cancer Brother

## 2025-07-31 LAB
ANION GAP SERPL CALCULATED.4IONS-SCNC: 11 MMOL/L (CALC) (ref 7–17)
BUN SERPL-MCNC: 22 MG/DL (ref 7–25)
BUN/CREAT SERPL: ABNORMAL (CALC) (ref 6–22)
CALCIUM SERPL-MCNC: 9 MG/DL (ref 8.6–10.3)
CHLORIDE SERPL-SCNC: 106 MMOL/L (ref 98–110)
CO2 SERPL-SCNC: 26 MMOL/L (ref 20–32)
CREAT SERPL-MCNC: 1.05 MG/DL (ref 0.7–1.35)
EGFRCR SERPLBLD CKD-EPI 2021: 77 ML/MIN/1.73M2
ERYTHROCYTE [DISTWIDTH] IN BLOOD BY AUTOMATED COUNT: 14 % (ref 11–15)
GLUCOSE SERPL-MCNC: 114 MG/DL (ref 65–99)
HCT VFR BLD AUTO: 42.7 % (ref 38.5–50)
HGB BLD-MCNC: 13.8 G/DL (ref 13.2–17.1)
MCH RBC QN AUTO: 28.9 PG (ref 27–33)
MCHC RBC AUTO-ENTMCNC: 32.3 G/DL (ref 32–36)
MCV RBC AUTO: 89.5 FL (ref 80–100)
PLATELET # BLD AUTO: 125 THOUSAND/UL (ref 140–400)
PMV BLD REES-ECKER: 12.3 FL (ref 7.5–12.5)
POTASSIUM SERPL-SCNC: 4 MMOL/L (ref 3.5–5.3)
RBC # BLD AUTO: 4.77 MILLION/UL (ref 4.2–5.8)
SODIUM SERPL-SCNC: 143 MMOL/L (ref 135–146)
WBC # BLD AUTO: 5.9 THOUSAND/UL (ref 3.8–10.8)

## 2025-08-13 ENCOUNTER — APPOINTMENT (OUTPATIENT)
Dept: CARDIOLOGY | Facility: CLINIC | Age: 70
End: 2025-08-13
Payer: MEDICARE

## 2025-08-15 ENCOUNTER — HOSPITAL ENCOUNTER (OUTPATIENT)
Facility: HOSPITAL | Age: 70
Setting detail: OUTPATIENT SURGERY
Discharge: HOME | End: 2025-08-15
Attending: INTERNAL MEDICINE | Admitting: INTERNAL MEDICINE
Payer: MEDICARE

## 2025-08-15 ENCOUNTER — APPOINTMENT (OUTPATIENT)
Dept: CARDIOLOGY | Facility: HOSPITAL | Age: 70
End: 2025-08-15
Payer: MEDICARE

## 2025-08-15 VITALS — WEIGHT: 230 LBS | HEIGHT: 68 IN | BODY MASS INDEX: 34.86 KG/M2

## 2025-08-15 DIAGNOSIS — I47.20 VENTRICULAR TACHYCARDIA, UNSPECIFIED (MULTI): ICD-10-CM

## 2025-08-15 DIAGNOSIS — Z95.810 ICD (IMPLANTABLE CARDIOVERTER-DEFIBRILLATOR) IN PLACE: Primary | ICD-10-CM

## 2025-08-15 PROCEDURE — 2750000001 HC OR 275 NO HCPCS: Performed by: INTERNAL MEDICINE

## 2025-08-15 PROCEDURE — C1882 AICD, OTHER THAN SING/DUAL: HCPCS | Performed by: INTERNAL MEDICINE

## 2025-08-15 PROCEDURE — 99153 MOD SED SAME PHYS/QHP EA: CPT | Performed by: INTERNAL MEDICINE

## 2025-08-15 PROCEDURE — 33229 REMV&REPLC PM GEN MULT LEADS: CPT | Performed by: INTERNAL MEDICINE

## 2025-08-15 PROCEDURE — 2720000007 HC OR 272 NO HCPCS: Performed by: INTERNAL MEDICINE

## 2025-08-15 PROCEDURE — 7100000010 HC PHASE TWO TIME - EACH INCREMENTAL 1 MINUTE: Performed by: INTERNAL MEDICINE

## 2025-08-15 PROCEDURE — 7100000001 HC RECOVERY ROOM TIME - INITIAL BASE CHARGE: Performed by: INTERNAL MEDICINE

## 2025-08-15 PROCEDURE — 33264 RMVL & RPLCMT DFB GEN MLT LD: CPT | Performed by: INTERNAL MEDICINE

## 2025-08-15 PROCEDURE — 99152 MOD SED SAME PHYS/QHP 5/>YRS: CPT | Performed by: INTERNAL MEDICINE

## 2025-08-15 PROCEDURE — 7100000002 HC RECOVERY ROOM TIME - EACH INCREMENTAL 1 MINUTE: Performed by: INTERNAL MEDICINE

## 2025-08-15 PROCEDURE — 2500000004 HC RX 250 GENERAL PHARMACY W/ HCPCS (ALT 636 FOR OP/ED): Performed by: INTERNAL MEDICINE

## 2025-08-15 PROCEDURE — 2780000003 HC OR 278 NO HCPCS: Performed by: INTERNAL MEDICINE

## 2025-08-15 PROCEDURE — C1781 MESH (IMPLANTABLE): HCPCS | Performed by: INTERNAL MEDICINE

## 2025-08-15 PROCEDURE — 7100000009 HC PHASE TWO TIME - INITIAL BASE CHARGE: Performed by: INTERNAL MEDICINE

## 2025-08-15 DEVICE — IMPLANTABLE DEVICE: Type: IMPLANTABLE DEVICE | Site: CHEST | Status: FUNCTIONAL

## 2025-08-15 RX ORDER — BUPIVACAINE HYDROCHLORIDE 5 MG/ML
INJECTION, SOLUTION EPIDURAL; INTRACAUDAL; PERINEURAL AS NEEDED
Status: DISCONTINUED | OUTPATIENT
Start: 2025-08-15 | End: 2025-08-15 | Stop reason: HOSPADM

## 2025-08-15 RX ORDER — FENTANYL CITRATE 50 UG/ML
INJECTION, SOLUTION INTRAMUSCULAR; INTRAVENOUS AS NEEDED
Status: DISCONTINUED | OUTPATIENT
Start: 2025-08-15 | End: 2025-08-15 | Stop reason: HOSPADM

## 2025-08-15 RX ORDER — MIDAZOLAM HYDROCHLORIDE 1 MG/ML
INJECTION, SOLUTION INTRAMUSCULAR; INTRAVENOUS AS NEEDED
Status: DISCONTINUED | OUTPATIENT
Start: 2025-08-15 | End: 2025-08-15 | Stop reason: HOSPADM

## 2025-08-18 RX ORDER — CEFADROXIL 500 MG/1
500 CAPSULE ORAL 2 TIMES DAILY
Qty: 14 CAPSULE | Refills: 0 | Status: SHIPPED | OUTPATIENT
Start: 2025-08-15

## 2025-08-20 DIAGNOSIS — I10 PRIMARY HYPERTENSION: ICD-10-CM

## 2025-08-20 DIAGNOSIS — I50.32 CHRONIC DIASTOLIC HEART FAILURE: ICD-10-CM

## 2025-08-20 DIAGNOSIS — N40.0 BENIGN PROSTATIC HYPERPLASIA WITHOUT LOWER URINARY TRACT SYMPTOMS: ICD-10-CM

## 2025-08-21 RX ORDER — FUROSEMIDE 20 MG/1
20 TABLET ORAL EVERY 12 HOURS
Qty: 180 TABLET | Refills: 1 | Status: SHIPPED | OUTPATIENT
Start: 2025-08-21

## 2025-08-21 RX ORDER — SPIRONOLACTONE 25 MG/1
25 TABLET ORAL DAILY
Qty: 30 TABLET | Refills: 1 | Status: SHIPPED | OUTPATIENT
Start: 2025-08-21

## 2025-08-21 RX ORDER — DOXAZOSIN 8 MG/1
8 TABLET ORAL NIGHTLY
Qty: 90 TABLET | Refills: 1 | Status: SHIPPED | OUTPATIENT
Start: 2025-08-21

## 2025-08-27 DIAGNOSIS — I50.32 CHRONIC DIASTOLIC HEART FAILURE: ICD-10-CM

## 2025-08-28 RX ORDER — SPIRONOLACTONE 25 MG/1
25 TABLET ORAL DAILY
Qty: 90 TABLET | Refills: 3 | Status: SHIPPED | OUTPATIENT
Start: 2025-08-28

## (undated) DEVICE — ENVELOPE, ANTIBACTERIAL, AIGIS RX TYRX, ABSORBABLE, LRG

## (undated) DEVICE — PAD, ELECTRODE DEFIB PADPRO ADULT STRL W/ADAPTER

## (undated) DEVICE — Device